# Patient Record
Sex: FEMALE | URBAN - METROPOLITAN AREA
[De-identification: names, ages, dates, MRNs, and addresses within clinical notes are randomized per-mention and may not be internally consistent; named-entity substitution may affect disease eponyms.]

---

## 2023-10-25 ENCOUNTER — ALLIED HEALTH (OUTPATIENT)
Dept: INTEGRATIVE MEDICINE | Facility: CLINIC | Age: 43
End: 2023-10-25
Payer: MEDICARE

## 2023-10-25 DIAGNOSIS — M79.10 MYALGIA: ICD-10-CM

## 2023-10-25 DIAGNOSIS — M99.03 SEGMENTAL AND SOMATIC DYSFUNCTION OF LUMBAR REGION: ICD-10-CM

## 2023-10-25 DIAGNOSIS — M99.05 SEGMENTAL AND SOMATIC DYSFUNCTION OF PELVIC REGION: ICD-10-CM

## 2023-10-25 DIAGNOSIS — M99.01 SEGMENTAL AND SOMATIC DYSFUNCTION OF CERVICAL REGION: Primary | ICD-10-CM

## 2023-10-25 DIAGNOSIS — M99.02 SEGMENTAL AND SOMATIC DYSFUNCTION OF THORACIC REGION: ICD-10-CM

## 2023-10-25 DIAGNOSIS — M54.59 MECHANICAL LOW BACK PAIN: ICD-10-CM

## 2023-10-25 DIAGNOSIS — M54.2 CERVICALGIA: ICD-10-CM

## 2023-10-25 PROCEDURE — 98941 CHIROPRACT MANJ 3-4 REGIONS: CPT | Performed by: CHIROPRACTOR

## 2023-10-25 PROCEDURE — 99203 OFFICE O/P NEW LOW 30 MIN: CPT | Performed by: CHIROPRACTOR

## 2023-10-25 ASSESSMENT — ENCOUNTER SYMPTOMS
LIGHT-HEADEDNESS: 0
CHEST TIGHTNESS: 0
FLANK PAIN: 0
FEVER: 0
MYALGIAS: 1
UNEXPECTED WEIGHT CHANGE: 0
TROUBLE SWALLOWING: 0
JOINT SWELLING: 0
FATIGUE: 0
DIZZINESS: 0
VOMITING: 0
SHORTNESS OF BREATH: 0
HEADACHES: 1
NECK STIFFNESS: 1
ARTHRALGIAS: 0
NUMBNESS: 0
NECK PAIN: 1
DIFFICULTY URINATING: 0
BACK PAIN: 1
CHILLS: 0
WEAKNESS: 0

## 2023-10-25 NOTE — PROGRESS NOTES
Subjective   Patient ID: Stefanie Blum is a 43 y.o. female who presents today, October 25, 2023 for a new patient evaluation and treatment of neck and low back pain.    (1/15) [facility]    Chiropractic Medicine HPI:  Provacative factors include : other: post exercise  Palliative factors incude : rest stretching other: massage  Pain is described as : ache dull nagging   Previous treatment for complaint has included : rest  Patient denies : difficulty walking bladder weakness bowel weakness catching clicking grinding instability numbness popping radiating pain into the distal extremity trauma  Intensity of the pain since last visit: Patient rates least severe pain at : 2/10 Patient rates most severe pain at : 5/10  Oswestry Disability Index has been completed: yes     Stefanie presents for evaluation and treatment of nec and low back pain. Patient states that low back symptoms are more chronic and intermittent. She states that the pain starts in the middle of the low back and goes to the left flank and top of the buttock. She denies any radicular pain into the LE. She states that neck pain began a month ago, the day after a workout. She states that it has been fairly constant since then. She states that she has been having headaches associated with the neck pain, at least once per week. She states that most of the neck pain is at the base of the skull on the left side. She states that nothing really increases low back or neck symptoms. She states that low back symptoms have been helped by chiropractic care and massage in the past. She states that rest has helped relieve neck pain. Patient denies any difficulty speaking/swallowing, vision changes, bowel/bladder issues, chest pain/shortness of breath, numbness/tingling.             Objective   Review of Systems   Constitutional:  Negative for chills, fatigue, fever and unexpected weight change.   HENT:  Negative for trouble swallowing.    Respiratory:  Negative for chest  tightness and shortness of breath.    Cardiovascular:  Negative for chest pain and leg swelling.   Gastrointestinal:  Negative for vomiting.   Genitourinary:  Negative for difficulty urinating and flank pain.   Musculoskeletal:  Positive for back pain, myalgias, neck pain and neck stiffness. Negative for arthralgias, gait problem and joint swelling.   Neurological:  Positive for headaches. Negative for dizziness, weakness, light-headedness and numbness.   Psychiatric/Behavioral:  Negative for self-injury and suicidal ideas.    All other systems reviewed and are negative.    Physical Exam  Constitutional:       General: She is not in acute distress.     Appearance: Normal appearance.       HENT:      Head: Normocephalic and atraumatic.   Musculoskeletal:      Cervical back: Tenderness present. Decreased range of motion.      Thoracic back: Tenderness present.      Lumbar back: Tenderness present.   Neurological:      General: No focal deficit present.      Mental Status: She is alert and oriented to person, place, and time.      Cranial Nerves: No dysarthria or facial asymmetry.      Sensory: Sensation is intact.      Motor: Motor function is intact.      Coordination: Coordination is intact.      Gait: Gait is intact.   Psychiatric:         Attention and Perception: Attention normal.         Mood and Affect: Mood normal.         Speech: Speech normal.         Behavior: Behavior is cooperative.        Spine Musculoskeletal Exam    Gait    Gait is normal.    Inspection    Thoracolumbar    Thoracolumbar inspection additional comments: Left hip hike    Palpation    Cervical Spine    Tenderness: present      Paraspinous: right and left      Trapezius: right and left      Periscapular: left    Right      Muscle tone: increased    Left      Muscle tone: increased    Thoracolumbar    Tenderness: present      Paraspinous: right and left      Gluteal: left      Iliac crest: left      SI Joint: left    Right      Muscle tone:  increased    Left      Muscle tone: increased    Range of Motion    Cervical Spine       Cervical flexion: chin to chest. Cervical flexion detail: no pain.     Cervical extension: reduced extension (0-25%). Cervical extension detail: no pain.       Right      Lateral bending: reduced bend (0-25%). Lateral bending detail: no pain.       Lateral rotation: reduced rotation (0-25%). Lateral rotation detail: no pain.       Left      Lateral bending: reduced bend (0-25%). Lateral bending detail: no pain.       Lateral rotation: reduced rotation (0-25%). Lateral rotation detail: no pain.      Thoracolumbar    Range of motion is normal.         Strength    Cervical Spine    Cervical spine motor exam is normal.    Thoracolumbar    Thoracolumbar motor exam is normal.       Sensory    Cervical Spine    Cervical spine sensation is normal.    Thoracolumbar    Thoracolumbar sensation is normal.    General    Neurological: alert     Other Orthopedic Tests:  Cervical: Right Maximum compression painless.  Left Maximum compression painless.  Cervical distraction positive.  Thoracic/Lumbar/Sacrum: Right Burgos's Thoracic painless.  Left Burgos's Thoracic painless.  Right Burgos's Lumbar painless.  Left Burgos's Lumbar with local pain.  Segmental Joint(s): Segmental joint dysfunction was assessed with motion palpation and is identified in the following areas:  Cervical : C1 C4 C5  Thoracic : T2., T5, T6, and T10  Lumbopelvic / Sacral SIJ : L5, L5/S1, and L SIJ      Assessment/Plan   Today's Treatment Included: Chiropractic manipulation to the Segmental Joint(s) Cervical : C1 C4 C5 Segmental Joint(s) Lumbopelvic/Sacral SIJ : L4, L5, L5/S1, and L SIJ Segmental Joint(s) Thoracic : T2., T5, T6, and T10   Treatment Techniques Used : Diversified CMT and Manual traction  Soft-Tissue manipulation    Soft-tissue mobilization was performed in the following areas:   Cervical paraspinal mm. bilateral, Upper Trapezius bilateral, and Levator Scap.  bilateral  Thoracic Paraspinal mm. bilateral and Rhomboids left  Lumbar Paraspinal mm. bilateral, Quadratus Lumborum left, and Gluteal mm. Glute. Med. left            Treatment Plan: The patient and I discussed the risks and benefits of Chiropractic Care.  Based on the patient's subjective complaints along with the examination findings, it is advised that a course of Chiropractic Treatment by initiated in the form of:   Chiropractic Manipulation (CMT)  Chiropractic treatment recommended at a frequency of 1 times per week for 4 weeks  Consent for care was given both written and orally by the patient.  The goals of the treatment will be to: decrease pain, increase range of motion, improve quality of life, and decrease muscular hypertonicity  The patient tolerated today's treatment with little or no additional discomfort and was instructed to contact the office for questions or concerns.   Follow up as scheduled.

## 2023-11-07 ENCOUNTER — ALLIED HEALTH (OUTPATIENT)
Dept: INTEGRATIVE MEDICINE | Facility: CLINIC | Age: 43
End: 2023-11-07
Payer: MEDICARE

## 2023-11-07 DIAGNOSIS — M79.10 MYALGIA: ICD-10-CM

## 2023-11-07 DIAGNOSIS — M99.01 SEGMENTAL AND SOMATIC DYSFUNCTION OF CERVICAL REGION: Primary | ICD-10-CM

## 2023-11-07 DIAGNOSIS — M54.59 MECHANICAL LOW BACK PAIN: ICD-10-CM

## 2023-11-07 DIAGNOSIS — M99.02 SEGMENTAL AND SOMATIC DYSFUNCTION OF THORACIC REGION: ICD-10-CM

## 2023-11-07 DIAGNOSIS — M54.2 CERVICALGIA: ICD-10-CM

## 2023-11-07 DIAGNOSIS — M99.05 SEGMENTAL AND SOMATIC DYSFUNCTION OF PELVIC REGION: ICD-10-CM

## 2023-11-07 DIAGNOSIS — M99.03 SEGMENTAL AND SOMATIC DYSFUNCTION OF LUMBAR REGION: ICD-10-CM

## 2023-11-07 PROCEDURE — 98941 CHIROPRACT MANJ 3-4 REGIONS: CPT | Performed by: CHIROPRACTOR

## 2023-11-07 NOTE — PROGRESS NOTES
Subjective   Patient ID: Stefanie Blum is a 43 y.o. female who presents November 7, 2023 for neck and low back pain.    (2/15) [facility]    Today, the patient rates their degree of pain as a 3 out of 10 on the numeric pain rating scale.     Stefanie returns for continued treatment of neck and low back pain. She states that she had mild increase in head pain after last visit that lasted for a day or so before dissipated. She states that she did not have any other soreness after last visit. She states that she had mild improvement in symptoms that have lasted until today. She states that she does not have a headache today. Denies any associated symptoms or change in medical history since last visit and will follow up in 1 week.              Objective   Physical Exam  Constitutional:       General: She is not in acute distress.     Appearance: Normal appearance.       HENT:      Head: Normocephalic and atraumatic.   Musculoskeletal:      Cervical back: Tenderness present. Decreased range of motion.      Thoracic back: Tenderness present.      Lumbar back: Tenderness present.   Neurological:      General: No focal deficit present.      Mental Status: She is alert and oriented to person, place, and time.      Cranial Nerves: No dysarthria or facial asymmetry.      Sensory: Sensation is intact.      Motor: Motor function is intact.      Coordination: Coordination is intact.      Gait: Gait is intact.   Psychiatric:         Attention and Perception: Attention normal.         Mood and Affect: Mood normal.         Speech: Speech normal.         Behavior: Behavior is cooperative.         Palpation of the following region(s) revealed:  Cervical: Upper trapezius bilateral, hypertonicity and tenderness.  Levator scapulae bilateral, hypertonicity and tenderness.  Cervical paraspinals bilateral, hypertonicity and tenderness.  Thoracic: Thoracic paraspinals bilateral, hypertonicity and tenderness.  Lumbar: Lumbar paraspinals bilateral,  hypertonicity and tenderness.  Quadratus lumborum left, hypertonicity and tenderness.  Gluteal left, hypertonicity and tenderness.        Segmental Joint(s): Segmental joint dysfunction was assessed with motion palpation and is identified in the following areas:  Cervical : C1 C4 C5  Thoracic : T2., T5, T6, and T10  Lumbopelvic / Sacral SIJ : L4, L5, L5/S1, and L SIJ      Assessment/Plan   Today's Treatment Included: Chiropractic manipulation to the Segmental Joint(s) Cervical : C1 C4 C5 Segmental Joint(s) Lumbopelvic/Sacral SIJ : L4, L5, L5/S1, and L SIJ Segmental Joint(s) Thoracic : T2., T5, T6, and T10   Treatment Techniques Used : Diversified CMT, Pelvic drop table technique, and Manual traction  Soft-Tissue manipulation    Soft-tissue mobilization was performed in the following areas:   Cervical paraspinal mm. bilateral, Upper Trapezius bilateral, and Levator Scap. bilateral  Thoracic Paraspinal mm. bilateral  Lumbar Paraspinal mm. bilateral, Quadratus Lumborum left, and Gluteal mm. Glute. Med. left            The patient tolerated today's treatment with little or no additional discomfort and was instructed to contact the office for questions or concerns.

## 2023-11-14 ENCOUNTER — ALLIED HEALTH (OUTPATIENT)
Dept: INTEGRATIVE MEDICINE | Facility: CLINIC | Age: 43
End: 2023-11-14
Payer: MEDICARE

## 2023-11-14 DIAGNOSIS — M54.59 MECHANICAL LOW BACK PAIN: ICD-10-CM

## 2023-11-14 DIAGNOSIS — M99.05 SEGMENTAL AND SOMATIC DYSFUNCTION OF PELVIC REGION: ICD-10-CM

## 2023-11-14 DIAGNOSIS — M79.10 MYALGIA: ICD-10-CM

## 2023-11-14 DIAGNOSIS — M99.03 SEGMENTAL AND SOMATIC DYSFUNCTION OF LUMBAR REGION: ICD-10-CM

## 2023-11-14 DIAGNOSIS — M54.2 CERVICALGIA: ICD-10-CM

## 2023-11-14 DIAGNOSIS — M99.01 SEGMENTAL AND SOMATIC DYSFUNCTION OF CERVICAL REGION: Primary | ICD-10-CM

## 2023-11-14 DIAGNOSIS — M99.02 SEGMENTAL AND SOMATIC DYSFUNCTION OF THORACIC REGION: ICD-10-CM

## 2023-11-14 PROCEDURE — 98941 CHIROPRACT MANJ 3-4 REGIONS: CPT | Performed by: CHIROPRACTOR

## 2023-11-14 NOTE — PROGRESS NOTES
Subjective   Patient ID: Stefanie Blum is a 43 y.o. female who presents November 14, 2023 for neck and low back pain.    (3/15) [facility]    Today, the patient rates their degree of pain as a 3 out of 10 on the numeric pain rating scale.     Stefanie returns for continued treatment of neck and low back pain. She states that she had improvement in symptoms after last visit that lasted for several days. She states that neck symptoms increased over the weekend while she was staying at a hotel with a very uncomfortable bed. Denies any associated symptoms or change in medical history since last visit and will follow up in 1 week.              Objective   Physical Exam  Constitutional:       General: She is not in acute distress.     Appearance: Normal appearance.       HENT:      Head: Normocephalic and atraumatic.   Musculoskeletal:      Cervical back: Tenderness present. Decreased range of motion.      Thoracic back: Tenderness present.      Lumbar back: Tenderness present.   Neurological:      General: No focal deficit present.      Mental Status: She is alert and oriented to person, place, and time.      Cranial Nerves: No dysarthria or facial asymmetry.      Sensory: Sensation is intact.      Motor: Motor function is intact.      Coordination: Coordination is intact.      Gait: Gait is intact.   Psychiatric:         Attention and Perception: Attention normal.         Mood and Affect: Mood normal.         Speech: Speech normal.         Behavior: Behavior is cooperative.         Palpation of the following region(s) revealed:  Cervical: Upper trapezius bilateral, hypertonicity and tenderness.  Levator scapulae bilateral, hypertonicity and tenderness.  Cervical paraspinals bilateral, hypertonicity and tenderness.  Thoracic: Thoracic paraspinals bilateral, hypertonicity and tenderness.  Lumbar: Lumbar paraspinals bilateral, hypertonicity and tenderness.  Quadratus lumborum left, hypertonicity and tenderness.  Gluteal left,  hypertonicity and tenderness.        Segmental Joint(s): Segmental joint dysfunction was assessed with motion palpation and is identified in the following areas:  Cervical : C1 C4 C5  Thoracic : T2., T5, T6, and T10  Lumbopelvic / Sacral SIJ : L4, L5, L5/S1, and L SIJ      Assessment/Plan   Today's Treatment Included: Chiropractic manipulation to the Segmental Joint(s) Cervical : C1 C4 C5 Segmental Joint(s) Lumbopelvic/Sacral SIJ : L4, L5, L5/S1, and L SIJ Segmental Joint(s) Thoracic : T2., T5, T6, and T10   Treatment Techniques Used : Diversified CMT, Pelvic drop table technique, and Manual traction  Soft-Tissue manipulation    Soft-tissue mobilization was performed in the following areas:   Cervical paraspinal mm. bilateral, Upper Trapezius bilateral, and Levator Scap. bilateral  Thoracic Paraspinal mm. bilateral  Lumbar Paraspinal mm. bilateral, Quadratus Lumborum left, and Gluteal mm. Glute. Med. left            The patient tolerated today's treatment with little or no additional discomfort and was instructed to contact the office for questions or concerns.

## 2023-11-21 ENCOUNTER — ALLIED HEALTH (OUTPATIENT)
Dept: INTEGRATIVE MEDICINE | Facility: CLINIC | Age: 43
End: 2023-11-21
Payer: MEDICARE

## 2023-11-21 DIAGNOSIS — M79.10 MYALGIA: ICD-10-CM

## 2023-11-21 DIAGNOSIS — M54.59 MECHANICAL LOW BACK PAIN: ICD-10-CM

## 2023-11-21 DIAGNOSIS — M99.05 SEGMENTAL AND SOMATIC DYSFUNCTION OF PELVIC REGION: ICD-10-CM

## 2023-11-21 DIAGNOSIS — M99.02 SEGMENTAL AND SOMATIC DYSFUNCTION OF THORACIC REGION: ICD-10-CM

## 2023-11-21 DIAGNOSIS — M99.03 SEGMENTAL AND SOMATIC DYSFUNCTION OF LUMBAR REGION: ICD-10-CM

## 2023-11-21 DIAGNOSIS — M54.2 CERVICALGIA: ICD-10-CM

## 2023-11-21 DIAGNOSIS — M99.01 SEGMENTAL AND SOMATIC DYSFUNCTION OF CERVICAL REGION: Primary | ICD-10-CM

## 2023-11-21 PROCEDURE — 98941 CHIROPRACT MANJ 3-4 REGIONS: CPT | Performed by: CHIROPRACTOR

## 2023-11-21 NOTE — PROGRESS NOTES
Subjective   Patient ID: Stefanie Blum is a 43 y.o. female who presents November 21, 2023 for neck and low back pain.    (4/15) [facility]    Today, the patient rates their degree of pain as a 2 out of 10 on the numeric pain rating scale.     Stefanie returns for continued treatment of neck and low back pain. She states that she has had improvement in symptoms since treatment began. She states that she mostly has neck/upper back tension. She states that she only has low back discomfort while she is lying prone on the chiropractic table. Denies any associated symptoms or change in medical history since last visit and will follow up in 1 week.              Objective   Physical Exam  Constitutional:       General: She is not in acute distress.     Appearance: Normal appearance.       HENT:      Head: Normocephalic and atraumatic.   Musculoskeletal:      Cervical back: Tenderness present. Decreased range of motion.      Thoracic back: Tenderness present.      Lumbar back: Tenderness present.   Neurological:      General: No focal deficit present.      Mental Status: She is alert and oriented to person, place, and time.      Cranial Nerves: No dysarthria or facial asymmetry.      Sensory: Sensation is intact.      Motor: Motor function is intact.      Coordination: Coordination is intact.      Gait: Gait is intact.   Psychiatric:         Attention and Perception: Attention normal.         Mood and Affect: Mood normal.         Speech: Speech normal.         Behavior: Behavior is cooperative.         Palpation of the following region(s) revealed:  Cervical: Upper trapezius bilateral, hypertonicity and tenderness.  Levator scapulae bilateral, hypertonicity and tenderness.  Cervical paraspinals bilateral, hypertonicity and tenderness.  Thoracic: Thoracic paraspinals bilateral, hypertonicity and tenderness.  Lumbar: Lumbar paraspinals bilateral, hypertonicity and tenderness.  Quadratus lumborum left, hypertonicity and  tenderness.  Gluteal left, hypertonicity and tenderness.        Segmental Joint(s): Segmental joint dysfunction was assessed with motion palpation and is identified in the following areas:  Cervical : C1 C4 C5  Thoracic : T2., T5, T6, and T10  Lumbopelvic / Sacral SIJ : L4, L5, L5/S1, and L SIJ      Assessment/Plan   Today's Treatment Included: Chiropractic manipulation to the Segmental Joint(s) Cervical : C1 C4 C5 Segmental Joint(s) Lumbopelvic/Sacral SIJ : L4, L5, L5/S1, and L SIJ Segmental Joint(s) Thoracic : T2., T5, T6, and T10   Treatment Techniques Used : Diversified CMT, Pelvic drop table technique, and Manual traction  Soft-Tissue manipulation    Soft-tissue mobilization was performed in the following areas:   Cervical paraspinal mm. bilateral, Upper Trapezius bilateral, and Levator Scap. bilateral  Thoracic Paraspinal mm. bilateral  Lumbar Paraspinal mm. bilateral, Quadratus Lumborum left, and Gluteal mm. Glute. Med. left            The patient tolerated today's treatment with little or no additional discomfort and was instructed to contact the office for questions or concerns.

## 2023-11-28 ENCOUNTER — APPOINTMENT (OUTPATIENT)
Dept: INTEGRATIVE MEDICINE | Facility: CLINIC | Age: 43
End: 2023-11-28
Payer: MEDICARE

## 2024-02-06 ENCOUNTER — OFFICE VISIT (OUTPATIENT)
Dept: PRIMARY CARE | Facility: CLINIC | Age: 44
End: 2024-02-06
Payer: COMMERCIAL

## 2024-02-06 ENCOUNTER — LAB (OUTPATIENT)
Dept: LAB | Facility: LAB | Age: 44
End: 2024-02-06
Payer: COMMERCIAL

## 2024-02-06 VITALS
RESPIRATION RATE: 18 BRPM | DIASTOLIC BLOOD PRESSURE: 74 MMHG | HEART RATE: 64 BPM | SYSTOLIC BLOOD PRESSURE: 122 MMHG | OXYGEN SATURATION: 98 % | WEIGHT: 120 LBS

## 2024-02-06 DIAGNOSIS — Z00.00 ROUTINE ADULT HEALTH MAINTENANCE: Primary | ICD-10-CM

## 2024-02-06 DIAGNOSIS — D50.9 MICROCYTIC ANEMIA: ICD-10-CM

## 2024-02-06 DIAGNOSIS — Z11.3 SCREEN FOR STD (SEXUALLY TRANSMITTED DISEASE): ICD-10-CM

## 2024-02-06 DIAGNOSIS — E78.5 HYPERLIPIDEMIA, UNSPECIFIED HYPERLIPIDEMIA TYPE: ICD-10-CM

## 2024-02-06 DIAGNOSIS — M85.672 BONE CYST OF LEFT FOOT: ICD-10-CM

## 2024-02-06 DIAGNOSIS — I10 PRIMARY HYPERTENSION: ICD-10-CM

## 2024-02-06 DIAGNOSIS — J30.2 SEASONAL ALLERGIES: ICD-10-CM

## 2024-02-06 DIAGNOSIS — Z12.11 COLON CANCER SCREENING: ICD-10-CM

## 2024-02-06 DIAGNOSIS — Z23 NEED FOR TETANUS BOOSTER: ICD-10-CM

## 2024-02-06 DIAGNOSIS — Z30.431 ENCOUNTER FOR ROUTINE CHECKING OF INTRAUTERINE CONTRACEPTIVE DEVICE (IUD): ICD-10-CM

## 2024-02-06 DIAGNOSIS — Z12.31 BREAST CANCER SCREENING BY MAMMOGRAM: ICD-10-CM

## 2024-02-06 DIAGNOSIS — A60.00 GENITAL HERPES SIMPLEX, UNSPECIFIED SITE: ICD-10-CM

## 2024-02-06 DIAGNOSIS — E55.9 VITAMIN D DEFICIENCY: ICD-10-CM

## 2024-02-06 LAB
ALBUMIN SERPL BCP-MCNC: 4.2 G/DL (ref 3.4–5)
ALP SERPL-CCNC: 47 U/L (ref 33–110)
ALT SERPL W P-5'-P-CCNC: 11 U/L (ref 7–45)
ANION GAP SERPL CALC-SCNC: 12 MMOL/L (ref 10–20)
AST SERPL W P-5'-P-CCNC: 18 U/L (ref 9–39)
BILIRUB SERPL-MCNC: 0.7 MG/DL (ref 0–1.2)
BUN SERPL-MCNC: 13 MG/DL (ref 6–23)
CALCIUM SERPL-MCNC: 9.2 MG/DL (ref 8.6–10.6)
CHLORIDE SERPL-SCNC: 105 MMOL/L (ref 98–107)
CHOLEST SERPL-MCNC: 178 MG/DL (ref 0–199)
CHOLESTEROL/HDL RATIO: 2.5
CO2 SERPL-SCNC: 25 MMOL/L (ref 21–32)
CREAT SERPL-MCNC: 0.97 MG/DL (ref 0.5–1.05)
EGFRCR SERPLBLD CKD-EPI 2021: 74 ML/MIN/1.73M*2
ERYTHROCYTE [DISTWIDTH] IN BLOOD BY AUTOMATED COUNT: 15.3 % (ref 11.5–14.5)
GLUCOSE SERPL-MCNC: 78 MG/DL (ref 74–99)
HCT VFR BLD AUTO: 35.5 % (ref 36–46)
HCV AB SER QL: NONREACTIVE
HDLC SERPL-MCNC: 71.4 MG/DL
HGB BLD-MCNC: 11.2 G/DL (ref 12–16)
HIV 1+2 AB+HIV1 P24 AG SERPL QL IA: NONREACTIVE
IRON SATN MFR SERPL: 23 % (ref 25–45)
IRON SERPL-MCNC: 72 UG/DL (ref 35–150)
LDLC SERPL CALC-MCNC: 98 MG/DL
MCH RBC QN AUTO: 22.9 PG (ref 26–34)
MCHC RBC AUTO-ENTMCNC: 31.5 G/DL (ref 32–36)
MCV RBC AUTO: 72 FL (ref 80–100)
NON HDL CHOLESTEROL: 107 MG/DL (ref 0–149)
NRBC BLD-RTO: 0 /100 WBCS (ref 0–0)
PLATELET # BLD AUTO: 205 X10*3/UL (ref 150–450)
POC APPEARANCE, URINE: CLEAR
POC BILIRUBIN, URINE: NEGATIVE
POC BLOOD, URINE: NEGATIVE
POC COLOR, URINE: YELLOW
POC GLUCOSE, URINE: NEGATIVE MG/DL
POC KETONES, URINE: NEGATIVE MG/DL
POC LEUKOCYTES, URINE: NEGATIVE
POC NITRITE,URINE: NEGATIVE
POC PH, URINE: 6 PH
POC PROTEIN, URINE: NEGATIVE MG/DL
POC SPECIFIC GRAVITY, URINE: 1.02
POC UROBILINOGEN, URINE: 0.2 EU/DL
POTASSIUM SERPL-SCNC: 3.9 MMOL/L (ref 3.5–5.3)
PROT SERPL-MCNC: 6.3 G/DL (ref 6.4–8.2)
RBC # BLD AUTO: 4.9 X10*6/UL (ref 4–5.2)
SODIUM SERPL-SCNC: 138 MMOL/L (ref 136–145)
TIBC SERPL-MCNC: 311 UG/DL (ref 240–445)
TRIGL SERPL-MCNC: 45 MG/DL (ref 0–149)
TSH SERPL-ACNC: 1.69 MIU/L (ref 0.44–3.98)
UIBC SERPL-MCNC: 239 UG/DL (ref 110–370)
VLDL: 9 MG/DL (ref 0–40)
WBC # BLD AUTO: 5.9 X10*3/UL (ref 4.4–11.3)

## 2024-02-06 PROCEDURE — 36415 COLL VENOUS BLD VENIPUNCTURE: CPT

## 2024-02-06 PROCEDURE — 1036F TOBACCO NON-USER: CPT | Performed by: PHYSICIAN ASSISTANT

## 2024-02-06 PROCEDURE — 82652 VIT D 1 25-DIHYDROXY: CPT

## 2024-02-06 PROCEDURE — 83550 IRON BINDING TEST: CPT

## 2024-02-06 PROCEDURE — 84443 ASSAY THYROID STIM HORMONE: CPT

## 2024-02-06 PROCEDURE — 3078F DIAST BP <80 MM HG: CPT | Performed by: PHYSICIAN ASSISTANT

## 2024-02-06 PROCEDURE — 80061 LIPID PANEL: CPT

## 2024-02-06 PROCEDURE — 85027 COMPLETE CBC AUTOMATED: CPT

## 2024-02-06 PROCEDURE — 81002 URINALYSIS NONAUTO W/O SCOPE: CPT | Performed by: PHYSICIAN ASSISTANT

## 2024-02-06 PROCEDURE — 83540 ASSAY OF IRON: CPT

## 2024-02-06 PROCEDURE — 80053 COMPREHEN METABOLIC PANEL: CPT

## 2024-02-06 PROCEDURE — 90715 TDAP VACCINE 7 YRS/> IM: CPT | Performed by: PHYSICIAN ASSISTANT

## 2024-02-06 PROCEDURE — 87389 HIV-1 AG W/HIV-1&-2 AB AG IA: CPT

## 2024-02-06 PROCEDURE — 99205 OFFICE O/P NEW HI 60 MIN: CPT | Performed by: PHYSICIAN ASSISTANT

## 2024-02-06 PROCEDURE — 86803 HEPATITIS C AB TEST: CPT

## 2024-02-06 PROCEDURE — 3074F SYST BP LT 130 MM HG: CPT | Performed by: PHYSICIAN ASSISTANT

## 2024-02-06 PROCEDURE — 90471 IMMUNIZATION ADMIN: CPT | Performed by: PHYSICIAN ASSISTANT

## 2024-02-06 RX ORDER — VALACYCLOVIR HYDROCHLORIDE 500 MG/1
500 TABLET, FILM COATED ORAL DAILY
COMMUNITY
End: 2024-03-04 | Stop reason: SDUPTHER

## 2024-02-06 RX ORDER — ERGOCALCIFEROL 1.25 MG/1
1 CAPSULE ORAL
COMMUNITY
Start: 2023-12-29 | End: 2024-05-23 | Stop reason: SDUPTHER

## 2024-02-06 RX ORDER — LOSARTAN POTASSIUM 25 MG/1
25 TABLET ORAL DAILY
COMMUNITY
Start: 2024-01-27 | End: 2024-03-04 | Stop reason: SDUPTHER

## 2024-02-06 RX ORDER — FERROUS SULFATE TAB 325 MG (65 MG ELEMENTAL FE) 325 (65 FE) MG
1 TAB ORAL DAILY
COMMUNITY
Start: 2023-12-28

## 2024-02-06 ASSESSMENT — ENCOUNTER SYMPTOMS
LOSS OF SENSATION IN FEET: 0
COUGH: 0
RESPIRATORY NEGATIVE: 1
VOMITING: 0
NERVOUS/ANXIOUS: 0
DIZZINESS: 0
HEMATOLOGIC/LYMPHATIC NEGATIVE: 1
SHORTNESS OF BREATH: 0
EYES NEGATIVE: 1
PSYCHIATRIC NEGATIVE: 1
NEUROLOGICAL NEGATIVE: 1
CONSTITUTIONAL NEGATIVE: 1
OCCASIONAL FEELINGS OF UNSTEADINESS: 0
GASTROINTESTINAL NEGATIVE: 1
ENDOCRINE NEGATIVE: 1
WHEEZING: 0
HEADACHES: 0
CARDIOVASCULAR NEGATIVE: 1
ABDOMINAL PAIN: 0
DEPRESSION: 0
ALLERGIC/IMMUNOLOGIC NEGATIVE: 1
ARTHRALGIAS: 0
NAUSEA: 0
BACK PAIN: 1
PALPITATIONS: 0

## 2024-02-06 ASSESSMENT — PATIENT HEALTH QUESTIONNAIRE - PHQ9
1. LITTLE INTEREST OR PLEASURE IN DOING THINGS: NOT AT ALL
SUM OF ALL RESPONSES TO PHQ9 QUESTIONS 1 AND 2: 0
2. FEELING DOWN, DEPRESSED OR HOPELESS: NOT AT ALL

## 2024-02-06 NOTE — PROGRESS NOTES
Subjective   Patient ID: Stefanie Blum is a 43 y.o. female who presents for New Patient Visit.    HPI   Patient Stefanie Blum 43 y.o. 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  children- 18yo 14 yo HB _   specialists - GYN   [facility] dental and vision UTD     PMhx significant medical hx HTN iron def anemia - mild hyperlipidemia - on fish oil - genital herpes - vit d def   PShx: rhinoplasty and breast augmentation   Social hx: etoh- , no tobacco use, no illicit drug use   Healthy -low cholesterol diet and exercise- yoga   immunizations - tdap due today - declined flu and covid this season   FMhx: mother Htn - pancreatic cancer , father HTN   Past medical, surgical, social, and family history all reviewed   patient states feeling well otherwise  denies fever, chills, N/V, headache, dizziness, CP, SOB, palpitations, edema, numbness, tingling, weakness  A chaperone was offered to the patient for the physical exam /  exam and was declined       Review of Systems   Constitutional: Negative.    HENT: Negative.     Eyes: Negative.    Respiratory: Negative.  Negative for cough, shortness of breath and wheezing.    Cardiovascular: Negative.  Negative for chest pain and palpitations.   Gastrointestinal: Negative.  Negative for abdominal pain, nausea and vomiting.   Endocrine: Negative.    Genitourinary: Negative.    Musculoskeletal:  Positive for back pain. Negative for arthralgias.        Cyst on left foot - top near 1st metatarsal -    Skin: Negative.  Negative for rash.   Allergic/Immunologic: Negative.    Neurological: Negative.  Negative for dizziness and headaches.   Hematological: Negative.         Hx anemia    Psychiatric/Behavioral: Negative.  The patient is not nervous/anxious.        Objective   /74 (BP Location: Right arm, Patient Position: Sitting)   Pulse 64   Resp 18   Wt 54.4 kg (120 lb)   SpO2 98%      Physical Exam  Vitals and nursing note reviewed.   Constitutional:       Appearance: Normal appearance. She is normal weight.   HENT:      Head: Normocephalic and atraumatic.      Right Ear: Tympanic membrane, ear canal and external ear normal.      Left Ear: Tympanic membrane, ear canal and external ear normal.      Nose: Nose normal.      Mouth/Throat:      Mouth: Mucous membranes are moist.      Pharynx: Oropharynx is clear.   Eyes:      Extraocular Movements: Extraocular movements intact.      Pupils: Pupils are equal, round, and reactive to light.   Cardiovascular:      Rate and Rhythm: Normal rate and regular rhythm.      Heart sounds: Normal heart sounds.   Pulmonary:      Effort: Pulmonary effort is normal.      Breath sounds: Normal breath sounds.   Abdominal:      General: Abdomen is flat. Bowel sounds are normal.      Palpations: Abdomen is soft.   Genitourinary:     Comments: Per GYN   Musculoskeletal:         General: Normal range of motion.      Cervical back: Normal range of motion and neck supple.      Comments: Cyst on left foot - top near 1st metatarsal - nontender - soft and mobile    Skin:     General: Skin is warm and dry.   Neurological:      General: No focal deficit present.      Mental Status: She is alert and oriented to person, place, and time.   Psychiatric:         Mood and Affect: Mood normal.         Behavior: Behavior normal.         Thought Content: Thought content normal.         Judgment: Judgment normal.         Assessment/Plan   Problem List Items Addressed This Visit       Primary hypertension    Relevant Orders    Comprehensive Metabolic Panel    TSH with reflex to Free T4 if abnormal    Vitamin D deficiency    Relevant Orders    Vitamin D 1,25 Dihydroxy (for eval of hypercalcemia)    Seasonal allergies    Microcytic anemia    Relevant Orders    CBC    Iron and TIBC    Hyperlipidemia - Primary    Relevant Orders    Comprehensive Metabolic Panel    Lipid Panel    Genital  herpes simplex     Other Visit Diagnoses       Screen for STD (sexually transmitted disease)        Relevant Orders    HIV 1/2 Antigen/Antibody Screen with Reflex to Confirmation    Hepatitis C Antibody    Need for tetanus booster        Relevant Orders    Tdap vaccine, age 7 years and older    Breast cancer screening by mammogram        Relevant Orders    BI mammo bilateral screening tomosynthesis    Encounter for routine checking of intrauterine contraceptive device (IUD)        Relevant Orders    Referral to Obstetrics / Gynecology    Colon cancer screening        Relevant Orders    Colonoscopy Screening; Average Risk Patient    Bone cyst of left foot        Relevant Orders    Referral to Podiatry          Est/ iron def anemia/ genital herpes     -  patient stable and healthy  -  discussed healthy diet and exercise plan   -  continue medications as directed, SEs, risks and options discussed   -  f/u with specialists as directed   -  dental and vision exams, f/u as directed   -  Labs ordered today, will call when results are available -Vaccinations recommended today: Influenza- declined Tdap- DUE tolerated well   -Follow-up annual exam in one year  -Colon cancer screening - DUE   -Follow-up in one week to discuss all results  - refer to gyn -IUD     Cyst on foot - refer to podiatry - ? Draining     Iron def anemia-- OTC iron - labs today     HTN     Benign essential hypertension (401.1) (I10)  -stable  -good control  -Continue current medications, side effects, risks and options discussed, patient aware   -Encouraged dietary sodium restriction/ DASH diet  -Recommend regular aerobic exercise  -Discussed need and benefit for weight loss  -Recheck in 4-6 months or sooner should any symptoms or problems arise  - Goal of blood pressure less than 130/80     Elevated cholesterol   -due for fasting labs today = continue OTC fish oil   -Encouraged following a low-fat low-cholesterol diet   -Discussed the benefits of  regular aerobic exercise and weight loss  -Encouraged following a low-carb healthy oil intake diet        Back pain - home stretches and exercises - sees chiropractor    complexity visit of 45   minutes face-to-face with at least half of the time discussing  Results of my examination, clinical findings, impression and diagnoses discussed with the patient as well as results of the latest test/lab work. The patient was in agreement with the plan and verbalized a good understanding of instructions and plans for treatment. At the end of the visit today, the patient felt that all questions had been answered satisfactorily. The patient was pleased with the visit and very appreciative for the care rendered.

## 2024-02-07 NOTE — RESULT ENCOUNTER NOTE
Please call and inform pt that all labs are stable - slight anemia - continue low fat cholesterol low sugar healthy diet with exercise multivitamin and vitamin D daily   And call office with any further questions or concerns

## 2024-02-08 LAB — 1,25(OH)2D3 SERPL-MCNC: 87.2 PG/ML (ref 19.9–79.3)

## 2024-03-04 DIAGNOSIS — I10 PRIMARY HYPERTENSION: ICD-10-CM

## 2024-03-04 DIAGNOSIS — A60.00 GENITAL HERPES SIMPLEX, UNSPECIFIED SITE: ICD-10-CM

## 2024-03-05 RX ORDER — LOSARTAN POTASSIUM 25 MG/1
25 TABLET ORAL DAILY
Qty: 90 TABLET | Refills: 1 | Status: SHIPPED | OUTPATIENT
Start: 2024-03-05

## 2024-03-05 RX ORDER — VALACYCLOVIR HYDROCHLORIDE 500 MG/1
500 TABLET, FILM COATED ORAL DAILY
Qty: 10 TABLET | Refills: 1 | Status: SHIPPED | OUTPATIENT
Start: 2024-03-05 | End: 2024-03-08 | Stop reason: SDUPTHER

## 2024-03-08 DIAGNOSIS — A60.00 GENITAL HERPES SIMPLEX, UNSPECIFIED SITE: ICD-10-CM

## 2024-03-11 RX ORDER — VALACYCLOVIR HYDROCHLORIDE 500 MG/1
500 TABLET, FILM COATED ORAL DAILY
Qty: 30 TABLET | Refills: 5 | Status: SHIPPED | OUTPATIENT
Start: 2024-03-11 | End: 2024-09-07

## 2024-05-23 DIAGNOSIS — E55.9 VITAMIN D DEFICIENCY: ICD-10-CM

## 2024-05-23 RX ORDER — ERGOCALCIFEROL 1.25 MG/1
1 CAPSULE ORAL
Qty: 30 CAPSULE | Refills: 0 | Status: SHIPPED | OUTPATIENT
Start: 2024-05-26

## 2024-06-11 DIAGNOSIS — I10 PRIMARY HYPERTENSION: ICD-10-CM

## 2024-06-12 RX ORDER — LOSARTAN POTASSIUM 25 MG/1
25 TABLET ORAL DAILY
Qty: 90 TABLET | Refills: 1 | Status: SHIPPED | OUTPATIENT
Start: 2024-06-12

## 2024-07-09 ENCOUNTER — ALLIED HEALTH (OUTPATIENT)
Dept: INTEGRATIVE MEDICINE | Facility: CLINIC | Age: 44
End: 2024-07-09
Payer: COMMERCIAL

## 2024-07-09 DIAGNOSIS — M99.03 SEGMENTAL AND SOMATIC DYSFUNCTION OF LUMBAR REGION: ICD-10-CM

## 2024-07-09 DIAGNOSIS — M99.05 SEGMENTAL AND SOMATIC DYSFUNCTION OF PELVIC REGION: ICD-10-CM

## 2024-07-09 DIAGNOSIS — M79.10 MYALGIA: ICD-10-CM

## 2024-07-09 DIAGNOSIS — M54.2 CERVICALGIA: ICD-10-CM

## 2024-07-09 DIAGNOSIS — M54.59 MECHANICAL LOW BACK PAIN: ICD-10-CM

## 2024-07-09 DIAGNOSIS — M99.02 SEGMENTAL AND SOMATIC DYSFUNCTION OF THORACIC REGION: ICD-10-CM

## 2024-07-09 DIAGNOSIS — M99.01 SEGMENTAL AND SOMATIC DYSFUNCTION OF CERVICAL REGION: Primary | ICD-10-CM

## 2024-07-09 PROCEDURE — 98941 CHIROPRACT MANJ 3-4 REGIONS: CPT | Performed by: CHIROPRACTOR

## 2024-07-09 NOTE — PROGRESS NOTES
Subjective   Patient ID: Gigi Blum is a 44 y.o. female who presents July 9, 2024 for neck and low back pain.    Visits: 1   $$$ PCY    Today, the patient rates their degree of pain as a 4 out of 10 on the numeric pain rating scale.     Gigi returns for continued treatment of neck pain. She states that she had been feeling better. She states that neck pain returned about 2 months ago. She states that she has been having more headaches recently and that prompted her to return to care. She states that her low back has been achy but painful. Denies any associated symptoms or change in medical history since last visit and will follow up in 1 week.              Objective   Physical Exam  Constitutional:       General: She is not in acute distress.     Appearance: Normal appearance.       HENT:      Head: Normocephalic and atraumatic.   Musculoskeletal:      Cervical back: Tenderness present. Decreased range of motion.      Thoracic back: Tenderness present.      Lumbar back: Tenderness present.   Neurological:      General: No focal deficit present.      Mental Status: She is alert and oriented to person, place, and time.      Cranial Nerves: No dysarthria or facial asymmetry.      Sensory: Sensation is intact.      Motor: Motor function is intact.      Coordination: Coordination is intact.      Gait: Gait is intact.   Psychiatric:         Attention and Perception: Attention normal.         Mood and Affect: Mood normal.         Speech: Speech normal.         Behavior: Behavior is cooperative.         Palpation of the following region(s) revealed:  Cervical: Upper trapezius bilateral, hypertonicity and tenderness.  Levator scapulae bilateral, hypertonicity and tenderness.  Cervical paraspinals bilateral, hypertonicity and tenderness.  Thoracic: Thoracic paraspinals bilateral, hypertonicity and tenderness.  Lumbar: Lumbar paraspinals bilateral, hypertonicity and tenderness.  Quadratus lumborum left, hypertonicity and  tenderness.  Gluteal left, hypertonicity and tenderness.        Segmental Joint(s): Segmental joint dysfunction was assessed with motion palpation and is identified in the following areas:  Cervical : C2 C3 C4 C5  Thoracic : T2., T5, T6, and T10  Lumbopelvic / Sacral SIJ : L4, L5, L5/S1, and L SIJ      Assessment/Plan   Today's Treatment Included: Chiropractic manipulation to the Segmental Joint(s) Cervical : C2 C3 C4 C5 Segmental Joint(s) Lumbopelvic/Sacral SIJ : L4, L5, L5/S1, and L SIJ Segmental Joint(s) Thoracic : T2., T5, T6, and T10   Treatment Techniques Used : Diversified CMT, Pelvic drop table technique, and Manual traction  Soft-Tissue manipulation    Soft-tissue mobilization was performed in the following areas:   Cervical paraspinal mm. bilateral, Upper Trapezius bilateral, and Levator Scap. bilateral  Thoracic Paraspinal mm. bilateral  Lumbar Paraspinal mm. bilateral, Quadratus Lumborum left, and Gluteal mm. Glute. Med. left            The patient tolerated today's treatment with little or no additional discomfort and was instructed to contact the office for questions or concerns.

## 2024-07-11 DIAGNOSIS — I10 PRIMARY HYPERTENSION: ICD-10-CM

## 2024-07-11 DIAGNOSIS — A60.00 GENITAL HERPES SIMPLEX, UNSPECIFIED SITE: ICD-10-CM

## 2024-07-11 RX ORDER — LOSARTAN POTASSIUM 25 MG/1
25 TABLET ORAL DAILY
Qty: 90 TABLET | Refills: 1 | Status: SHIPPED | OUTPATIENT
Start: 2024-07-11

## 2024-07-11 RX ORDER — VALACYCLOVIR HYDROCHLORIDE 500 MG/1
500 TABLET, FILM COATED ORAL DAILY
Qty: 30 TABLET | Refills: 2 | Status: SHIPPED | OUTPATIENT
Start: 2024-07-11 | End: 2024-10-09

## 2024-07-17 ENCOUNTER — APPOINTMENT (OUTPATIENT)
Dept: INTEGRATIVE MEDICINE | Facility: CLINIC | Age: 44
End: 2024-07-17
Payer: COMMERCIAL

## 2024-07-17 DIAGNOSIS — M99.03 SEGMENTAL AND SOMATIC DYSFUNCTION OF LUMBAR REGION: ICD-10-CM

## 2024-07-17 DIAGNOSIS — M99.02 SEGMENTAL AND SOMATIC DYSFUNCTION OF THORACIC REGION: ICD-10-CM

## 2024-07-17 DIAGNOSIS — M99.01 SEGMENTAL AND SOMATIC DYSFUNCTION OF CERVICAL REGION: Primary | ICD-10-CM

## 2024-07-17 DIAGNOSIS — M54.59 MECHANICAL LOW BACK PAIN: ICD-10-CM

## 2024-07-17 DIAGNOSIS — M54.2 CERVICALGIA: ICD-10-CM

## 2024-07-17 DIAGNOSIS — M79.10 MYALGIA: ICD-10-CM

## 2024-07-17 DIAGNOSIS — M99.05 SEGMENTAL AND SOMATIC DYSFUNCTION OF PELVIC REGION: ICD-10-CM

## 2024-07-17 PROCEDURE — 98941 CHIROPRACT MANJ 3-4 REGIONS: CPT | Performed by: CHIROPRACTOR

## 2024-07-17 NOTE — PROGRESS NOTES
Subjective   Patient ID: Gigi Blum is a 44 y.o. female who presents July 17, 2024 for neck and low back pain.    Visits: 2   $$$ PCY    Today, the patient rates their degree of pain as a 2 out of 10 on the numeric pain rating scale.     Gigi returns for continued treatment of neck pain. She states that she had improvement in symptoms after last visit that has lasted until today. She states that she had some increased soreness after last visit that lasted for about a day. She states that she has had less head pain. Denies any associated symptoms or change in medical history since last visit and will follow up in 3-4 weeks.              Objective   Physical Exam  Constitutional:       General: She is not in acute distress.     Appearance: Normal appearance.       HENT:      Head: Normocephalic and atraumatic.   Musculoskeletal:      Cervical back: Tenderness present. Decreased range of motion.      Thoracic back: Tenderness present.      Lumbar back: Tenderness present.   Neurological:      General: No focal deficit present.      Mental Status: She is alert and oriented to person, place, and time.      Cranial Nerves: No dysarthria or facial asymmetry.      Sensory: Sensation is intact.      Motor: Motor function is intact.      Coordination: Coordination is intact.      Gait: Gait is intact.   Psychiatric:         Attention and Perception: Attention normal.         Mood and Affect: Mood normal.         Speech: Speech normal.         Behavior: Behavior is cooperative.         Palpation of the following region(s) revealed:  Cervical: Upper trapezius bilateral, hypertonicity and tenderness.  Levator scapulae bilateral, hypertonicity and tenderness.  Cervical paraspinals bilateral, hypertonicity and tenderness.  Thoracic: Thoracic paraspinals bilateral, hypertonicity and tenderness.  Lumbar: Lumbar paraspinals bilateral, hypertonicity and tenderness.  Quadratus lumborum left, hypertonicity and tenderness.  Gluteal  left, hypertonicity and tenderness.        Segmental Joint(s): Segmental joint dysfunction was assessed with motion palpation and is identified in the following areas:  Cervical : C2 C3 C4 C5  Thoracic : T2., T5, T6, and T10  Lumbopelvic / Sacral SIJ : L4, L5, L5/S1, and L SIJ      Assessment/Plan   Today's Treatment Included: Chiropractic manipulation to the Segmental Joint(s) Cervical : C2 C3 C4 C5 Segmental Joint(s) Lumbopelvic/Sacral SIJ : L4, L5, L5/S1, and L SIJ Segmental Joint(s) Thoracic : T2., T5, T6, and T10   Treatment Techniques Used : Diversified CMT, Pelvic drop table technique, and Manual traction  Soft-Tissue manipulation    Soft-tissue mobilization was performed in the following areas:   Cervical paraspinal mm. bilateral, Upper Trapezius bilateral, and Levator Scap. bilateral  Thoracic Paraspinal mm. bilateral  Lumbar Paraspinal mm. bilateral, Quadratus Lumborum left, and Gluteal mm. Glute. Med. left            The patient tolerated today's treatment with little or no additional discomfort and was instructed to contact the office for questions or concerns.

## 2024-08-07 ENCOUNTER — APPOINTMENT (OUTPATIENT)
Dept: INTEGRATIVE MEDICINE | Facility: CLINIC | Age: 44
End: 2024-08-07
Payer: COMMERCIAL

## 2024-08-07 DIAGNOSIS — M99.02 SEGMENTAL AND SOMATIC DYSFUNCTION OF THORACIC REGION: ICD-10-CM

## 2024-08-07 DIAGNOSIS — M99.01 SEGMENTAL AND SOMATIC DYSFUNCTION OF CERVICAL REGION: Primary | ICD-10-CM

## 2024-08-07 DIAGNOSIS — M54.59 MECHANICAL LOW BACK PAIN: ICD-10-CM

## 2024-08-07 DIAGNOSIS — M54.2 CERVICALGIA: ICD-10-CM

## 2024-08-07 DIAGNOSIS — M79.10 MYALGIA: ICD-10-CM

## 2024-08-07 DIAGNOSIS — M99.05 SEGMENTAL AND SOMATIC DYSFUNCTION OF PELVIC REGION: ICD-10-CM

## 2024-08-07 DIAGNOSIS — M99.03 SEGMENTAL AND SOMATIC DYSFUNCTION OF LUMBAR REGION: ICD-10-CM

## 2024-08-07 PROCEDURE — 98941 CHIROPRACT MANJ 3-4 REGIONS: CPT | Performed by: CHIROPRACTOR

## 2024-08-07 NOTE — PROGRESS NOTES
Subjective   Patient ID: Gigi Blum is a 44 y.o. female who presents August 7, 2024 for neck and low back pain.    Visits: 3   $$$ PCY    Today, the patient rates their degree of pain as a 2 out of 10 on the numeric pain rating scale.     Gigi returns for continued treatment of neck pain. She states that she has been doing well. She states that she has had mild flare ups in neck pain since last visit. She states that she occasionally has had a headache with increased neck symptoms. She states that her low back has mostly been tight. Denies any associated symptoms or change in medical history since last visit and will follow up in 2 weeks.              Objective   Physical Exam  Constitutional:       General: She is not in acute distress.     Appearance: Normal appearance.       HENT:      Head: Normocephalic and atraumatic.   Musculoskeletal:      Cervical back: Tenderness present. Decreased range of motion.      Thoracic back: Tenderness present.      Lumbar back: Tenderness present.   Neurological:      General: No focal deficit present.      Mental Status: She is alert and oriented to person, place, and time.      Cranial Nerves: No dysarthria or facial asymmetry.      Sensory: Sensation is intact.      Motor: Motor function is intact.      Coordination: Coordination is intact.      Gait: Gait is intact.   Psychiatric:         Attention and Perception: Attention normal.         Mood and Affect: Mood normal.         Speech: Speech normal.         Behavior: Behavior is cooperative.         Palpation of the following region(s) revealed:  Cervical: Upper trapezius bilateral, hypertonicity and tenderness.  Levator scapulae bilateral, hypertonicity and tenderness.  Cervical paraspinals bilateral, hypertonicity and tenderness.  Thoracic: Thoracic paraspinals bilateral, hypertonicity and tenderness.  Lumbar: Lumbar paraspinals bilateral, hypertonicity and tenderness.  Quadratus lumborum left, hypertonicity and  tenderness.  Gluteal left, hypertonicity and tenderness.        Segmental Joint(s): Segmental joint dysfunction was assessed with motion palpation and is identified in the following areas:  Cervical : C2 C3 C4 C5  Thoracic : T2., T5, T6, and T10  Lumbopelvic / Sacral SIJ : L4, L5, L5/S1, and L SIJ      Assessment/Plan   Today's Treatment Included: Chiropractic manipulation to the Segmental Joint(s) Cervical : C2 C3 C4 C5 Segmental Joint(s) Lumbopelvic/Sacral SIJ : L4, L5, L5/S1, and L SIJ Segmental Joint(s) Thoracic : T2., T5, T6, and T10   Treatment Techniques Used : Diversified CMT, Pelvic drop table technique, and Manual traction  Soft-Tissue manipulation    Soft-tissue mobilization was performed in the following areas:   Cervical paraspinal mm. bilateral, Upper Trapezius bilateral, and Levator Scap. bilateral  Thoracic Paraspinal mm. bilateral  Lumbar Paraspinal mm. bilateral, Quadratus Lumborum left, and Gluteal mm. Glute. Med. left            The patient tolerated today's treatment with little or no additional discomfort and was instructed to contact the office for questions or concerns.

## 2024-08-08 ENCOUNTER — APPOINTMENT (OUTPATIENT)
Dept: INTEGRATIVE MEDICINE | Facility: CLINIC | Age: 44
End: 2024-08-08
Payer: COMMERCIAL

## 2024-08-20 DIAGNOSIS — I10 PRIMARY HYPERTENSION: ICD-10-CM

## 2024-08-21 ENCOUNTER — APPOINTMENT (OUTPATIENT)
Dept: INTEGRATIVE MEDICINE | Facility: CLINIC | Age: 44
End: 2024-08-21
Payer: COMMERCIAL

## 2024-08-21 DIAGNOSIS — M79.10 MYALGIA: ICD-10-CM

## 2024-08-21 DIAGNOSIS — M99.05 SEGMENTAL AND SOMATIC DYSFUNCTION OF PELVIC REGION: ICD-10-CM

## 2024-08-21 DIAGNOSIS — M99.01 SEGMENTAL AND SOMATIC DYSFUNCTION OF CERVICAL REGION: Primary | ICD-10-CM

## 2024-08-21 DIAGNOSIS — M99.03 SEGMENTAL AND SOMATIC DYSFUNCTION OF LUMBAR REGION: ICD-10-CM

## 2024-08-21 DIAGNOSIS — M99.02 SEGMENTAL AND SOMATIC DYSFUNCTION OF THORACIC REGION: ICD-10-CM

## 2024-08-21 DIAGNOSIS — M54.2 CERVICALGIA: ICD-10-CM

## 2024-08-21 DIAGNOSIS — M54.59 MECHANICAL LOW BACK PAIN: ICD-10-CM

## 2024-08-21 PROCEDURE — 98941 CHIROPRACT MANJ 3-4 REGIONS: CPT | Performed by: CHIROPRACTOR

## 2024-08-21 RX ORDER — LOSARTAN POTASSIUM 25 MG/1
25 TABLET ORAL DAILY
Qty: 90 TABLET | Refills: 0 | Status: SHIPPED | OUTPATIENT
Start: 2024-08-21

## 2024-08-21 NOTE — PROGRESS NOTES
Subjective   Patient ID: Gigi Blum is a 44 y.o. female who presents August 21, 2024 for neck and low back pain.    Visits: 4   $$$ PCY    Today, the patient rates their degree of pain as a 2 out of 10 on the numeric pain rating scale.     Gigi returns for continued treatment of neck pain. She states that she is well today. She states that she had a headache Monday but symptoms have mostly dissipated by today. She states that she mostly has neck and low back tightness today. Denies any associated symptoms or change in medical history since last visit and will follow up in 2 weeks.              Objective   Physical Exam  Constitutional:       General: She is not in acute distress.     Appearance: Normal appearance.       HENT:      Head: Normocephalic and atraumatic.   Musculoskeletal:      Cervical back: Tenderness present. Decreased range of motion.      Thoracic back: Tenderness present.      Lumbar back: Tenderness present.   Neurological:      General: No focal deficit present.      Mental Status: She is alert and oriented to person, place, and time.      Cranial Nerves: No dysarthria or facial asymmetry.      Sensory: Sensation is intact.      Motor: Motor function is intact.      Coordination: Coordination is intact.      Gait: Gait is intact.   Psychiatric:         Attention and Perception: Attention normal.         Mood and Affect: Mood normal.         Speech: Speech normal.         Behavior: Behavior is cooperative.         Palpation of the following region(s) revealed:  Cervical: Upper trapezius bilateral, hypertonicity and tenderness.  Levator scapulae bilateral, hypertonicity and tenderness.  Cervical paraspinals bilateral, hypertonicity and tenderness.  Thoracic: Thoracic paraspinals bilateral, hypertonicity and tenderness.  Lumbar: Lumbar paraspinals bilateral, hypertonicity and tenderness.  Quadratus lumborum left, hypertonicity and tenderness.  Gluteal left, hypertonicity and  tenderness.        Segmental Joint(s): Segmental joint dysfunction was assessed with motion palpation and is identified in the following areas:  Cervical : C2 C3 C4 C5  Thoracic : T2., T5, T6, and T10  Lumbopelvic / Sacral SIJ : L4, L5, L5/S1, and L SIJ      Assessment/Plan   Today's Treatment Included: Chiropractic manipulation to the Segmental Joint(s) Cervical : C2 C3 C4 C5 Segmental Joint(s) Lumbopelvic/Sacral SIJ : L4, L5, L5/S1, and L SIJ Segmental Joint(s) Thoracic : T2., T5, T6, and T10   Treatment Techniques Used : Diversified CMT, Pelvic drop table technique, and Manual traction  Soft-Tissue manipulation    Soft-tissue mobilization was performed in the following areas:   Cervical paraspinal mm. bilateral, Upper Trapezius bilateral, and Levator Scap. bilateral  Thoracic Paraspinal mm. bilateral  Lumbar Paraspinal mm. bilateral, Quadratus Lumborum left, and Gluteal mm. Glute. Med. left            The patient tolerated today's treatment with little or no additional discomfort and was instructed to contact the office for questions or concerns.

## 2024-08-26 ENCOUNTER — HOSPITAL ENCOUNTER (OUTPATIENT)
Dept: RADIOLOGY | Facility: CLINIC | Age: 44
Discharge: HOME | End: 2024-08-26
Payer: COMMERCIAL

## 2024-08-26 VITALS — HEIGHT: 63 IN | WEIGHT: 118 LBS | BODY MASS INDEX: 20.91 KG/M2

## 2024-08-26 DIAGNOSIS — Z12.31 BREAST CANCER SCREENING BY MAMMOGRAM: ICD-10-CM

## 2024-08-26 PROCEDURE — 77067 SCR MAMMO BI INCL CAD: CPT

## 2024-08-28 ENCOUNTER — APPOINTMENT (OUTPATIENT)
Dept: RADIOLOGY | Facility: CLINIC | Age: 44
End: 2024-08-28
Payer: COMMERCIAL

## 2024-09-04 ENCOUNTER — APPOINTMENT (OUTPATIENT)
Dept: INTEGRATIVE MEDICINE | Facility: CLINIC | Age: 44
End: 2024-09-04
Payer: COMMERCIAL

## 2024-09-04 DIAGNOSIS — M99.05 SEGMENTAL AND SOMATIC DYSFUNCTION OF PELVIC REGION: ICD-10-CM

## 2024-09-04 DIAGNOSIS — M99.01 SEGMENTAL AND SOMATIC DYSFUNCTION OF CERVICAL REGION: Primary | ICD-10-CM

## 2024-09-04 DIAGNOSIS — M54.2 CERVICALGIA: ICD-10-CM

## 2024-09-04 DIAGNOSIS — M79.10 MYALGIA: ICD-10-CM

## 2024-09-04 DIAGNOSIS — M99.02 SEGMENTAL AND SOMATIC DYSFUNCTION OF THORACIC REGION: ICD-10-CM

## 2024-09-04 DIAGNOSIS — M99.03 SEGMENTAL AND SOMATIC DYSFUNCTION OF LUMBAR REGION: ICD-10-CM

## 2024-09-04 DIAGNOSIS — M54.59 MECHANICAL LOW BACK PAIN: ICD-10-CM

## 2024-09-04 PROCEDURE — 98941 CHIROPRACT MANJ 3-4 REGIONS: CPT | Performed by: CHIROPRACTOR

## 2024-09-04 NOTE — PROGRESS NOTES
Subjective   Patient ID: Gigi Blum is a 44 y.o. female who presents September 4, 2024 for neck and low back pain.    Visits: 5   $$$ PCY    Today, the patient rates their degree of pain as a 1 out of 10 on the numeric pain rating scale.     Gigi returns for continued treatment of neck pain. She states that she has been feeling well lately. She states that she has not really had any neck pain since last visit. She states that she woke up with mild neck discomfort Monday but states that she had difficulty sleeping the night before and symptoms dissipated by yesterday morning. Denies any associated symptoms or change in medical history since last visit and will follow up in 1 month.              Objective   Physical Exam  Constitutional:       General: She is not in acute distress.     Appearance: Normal appearance.       HENT:      Head: Normocephalic and atraumatic.   Musculoskeletal:      Cervical back: Tenderness present. Decreased range of motion.      Thoracic back: Tenderness present.      Lumbar back: Tenderness present.   Neurological:      General: No focal deficit present.      Mental Status: She is alert and oriented to person, place, and time.      Cranial Nerves: No dysarthria or facial asymmetry.      Sensory: Sensation is intact.      Motor: Motor function is intact.      Coordination: Coordination is intact.      Gait: Gait is intact.   Psychiatric:         Attention and Perception: Attention normal.         Mood and Affect: Mood normal.         Speech: Speech normal.         Behavior: Behavior is cooperative.         Palpation of the following region(s) revealed:  Cervical: Upper trapezius bilateral, hypertonicity and tenderness.  Levator scapulae bilateral, hypertonicity and tenderness.  Cervical paraspinals bilateral, hypertonicity and tenderness.  Thoracic: Thoracic paraspinals bilateral, hypertonicity and tenderness.  Lumbar: Lumbar paraspinals bilateral, hypertonicity and  tenderness.  Quadratus lumborum left, hypertonicity and tenderness.  Gluteal left, hypertonicity and tenderness.        Segmental Joint(s): Segmental joint dysfunction was assessed with motion palpation and is identified in the following areas:  Cervical : C2 C3 C4 C5  Thoracic : T2., T5, T6, and T10  Lumbopelvic / Sacral SIJ : L4, L5, L5/S1, and L SIJ      Assessment/Plan   Today's Treatment Included: Chiropractic manipulation to the Segmental Joint(s) Cervical : C2 C3 C4 C5 Segmental Joint(s) Lumbopelvic/Sacral SIJ : L4, L5, L5/S1, and L SIJ Segmental Joint(s) Thoracic : T2., T5, T6, and T10   Treatment Techniques Used : Diversified CMT, Pelvic drop table technique, and Manual traction  Soft-Tissue manipulation    Soft-tissue mobilization was performed in the following areas:   Cervical paraspinal mm. bilateral, Upper Trapezius bilateral, and Levator Scap. bilateral  Thoracic Paraspinal mm. bilateral  Lumbar Paraspinal mm. bilateral, Quadratus Lumborum left, and Gluteal mm. Glute. Med. left            The patient tolerated today's treatment with little or no additional discomfort and was instructed to contact the office for questions or concerns.

## 2024-09-05 ENCOUNTER — HOSPITAL ENCOUNTER (OUTPATIENT)
Dept: RADIOLOGY | Facility: EXTERNAL LOCATION | Age: 44
Discharge: HOME | End: 2024-09-05

## 2024-09-12 ENCOUNTER — APPOINTMENT (OUTPATIENT)
Dept: PRIMARY CARE | Facility: CLINIC | Age: 44
End: 2024-09-12
Payer: COMMERCIAL

## 2024-09-12 VITALS
OXYGEN SATURATION: 98 % | SYSTOLIC BLOOD PRESSURE: 142 MMHG | HEIGHT: 63 IN | BODY MASS INDEX: 20.82 KG/M2 | DIASTOLIC BLOOD PRESSURE: 88 MMHG | WEIGHT: 117.5 LBS | HEART RATE: 61 BPM

## 2024-09-12 DIAGNOSIS — J30.2 SEASONAL ALLERGIES: Primary | ICD-10-CM

## 2024-09-12 DIAGNOSIS — A60.00 GENITAL HERPES SIMPLEX, UNSPECIFIED SITE: ICD-10-CM

## 2024-09-12 DIAGNOSIS — I10 PRIMARY HYPERTENSION: ICD-10-CM

## 2024-09-12 PROCEDURE — 99214 OFFICE O/P EST MOD 30 MIN: CPT | Performed by: PHYSICIAN ASSISTANT

## 2024-09-12 PROCEDURE — 3077F SYST BP >= 140 MM HG: CPT | Performed by: PHYSICIAN ASSISTANT

## 2024-09-12 PROCEDURE — 1036F TOBACCO NON-USER: CPT | Performed by: PHYSICIAN ASSISTANT

## 2024-09-12 PROCEDURE — 3008F BODY MASS INDEX DOCD: CPT | Performed by: PHYSICIAN ASSISTANT

## 2024-09-12 PROCEDURE — 3079F DIAST BP 80-89 MM HG: CPT | Performed by: PHYSICIAN ASSISTANT

## 2024-09-12 RX ORDER — VALACYCLOVIR HYDROCHLORIDE 500 MG/1
500 TABLET, FILM COATED ORAL DAILY
Qty: 90 TABLET | Refills: 1 | Status: SHIPPED | OUTPATIENT
Start: 2024-09-12 | End: 2025-03-11

## 2024-09-12 RX ORDER — LOSARTAN POTASSIUM 25 MG/1
25 TABLET ORAL DAILY
Qty: 90 TABLET | Refills: 1 | Status: SHIPPED | OUTPATIENT
Start: 2024-09-12

## 2024-09-12 ASSESSMENT — ENCOUNTER SYMPTOMS
VOMITING: 0
COUGH: 0
PALPITATIONS: 0
OCCASIONAL FEELINGS OF UNSTEADINESS: 0
PSYCHIATRIC NEGATIVE: 1
DEPRESSION: 0
LOSS OF SENSATION IN FEET: 0
HEMATOLOGIC/LYMPHATIC NEGATIVE: 1
SHORTNESS OF BREATH: 0
RESPIRATORY NEGATIVE: 1
ABDOMINAL PAIN: 0
DIZZINESS: 0
CARDIOVASCULAR NEGATIVE: 1
GASTROINTESTINAL NEGATIVE: 1
ARTHRALGIAS: 0
WHEEZING: 0
ALLERGIC/IMMUNOLOGIC NEGATIVE: 1
ENDOCRINE NEGATIVE: 1
MUSCULOSKELETAL NEGATIVE: 1
BACK PAIN: 0
NERVOUS/ANXIOUS: 0
HEADACHES: 0
NAUSEA: 0
EYES NEGATIVE: 1
NEUROLOGICAL NEGATIVE: 1
CONSTITUTIONAL NEGATIVE: 1

## 2024-09-12 ASSESSMENT — PATIENT HEALTH QUESTIONNAIRE - PHQ9
SUM OF ALL RESPONSES TO PHQ9 QUESTIONS 1 AND 2: 0
2. FEELING DOWN, DEPRESSED OR HOPELESS: NOT AT ALL
1. LITTLE INTEREST OR PLEASURE IN DOING THINGS: NOT AT ALL

## 2024-09-12 NOTE — PROGRESS NOTES
"Subjective   Patient ID: Gigi Blum is a 44 y.o. female who presents for Med Refill.    Med Refill  Pertinent negatives include no abdominal pain, arthralgias, chest pain, coughing, headaches, nausea, rash or vomiting.      Patient Stefanie Blum 44 y.o. female is here today for follow up  HTN - Stable on medications- did not take today - forgot - no CP no SOB no headache no edema   Due for refills     Herpes genital - takes only as needed usually before cycle - needs refill    patient states feeling well otherwise  denies fever, chills, N/V, headache, dizziness, CP, SOB, palpitations, edema, numbness, tingling, weakness  A chaperone was offered to the patient for the physical exam /  exam and was declined       Review of Systems   Constitutional: Negative.    HENT: Negative.     Eyes: Negative.    Respiratory: Negative.  Negative for cough, shortness of breath and wheezing.    Cardiovascular: Negative.  Negative for chest pain and palpitations.   Gastrointestinal: Negative.  Negative for abdominal pain, nausea and vomiting.   Endocrine: Negative.    Genitourinary: Negative.    Musculoskeletal: Negative.  Negative for arthralgias and back pain.   Skin: Negative.  Negative for rash.   Allergic/Immunologic: Negative.    Neurological: Negative.  Negative for dizziness and headaches.   Hematological: Negative.    Psychiatric/Behavioral: Negative.  The patient is not nervous/anxious.        Objective   /88 (BP Location: Right arm, Patient Position: Sitting)   Pulse 61   Ht 1.6 m (5' 3\")   Wt 53.3 kg (117 lb 8.1 oz)   SpO2 98%   BMI 20.82 kg/m²     Physical Exam  Vitals and nursing note reviewed.   Constitutional:       Appearance: Normal appearance.   HENT:      Head: Normocephalic and atraumatic.   Cardiovascular:      Rate and Rhythm: Normal rate and regular rhythm.   Pulmonary:      Effort: Pulmonary effort is normal.      Breath sounds: Normal breath sounds.   Abdominal:      General: Bowel sounds " are normal.      Palpations: Abdomen is soft.   Musculoskeletal:         General: Normal range of motion.      Cervical back: Neck supple.   Skin:     General: Skin is warm and dry.   Neurological:      General: No focal deficit present.      Mental Status: She is alert and oriented to person, place, and time.   Psychiatric:         Mood and Affect: Mood normal.         Behavior: Behavior normal.         Thought Content: Thought content normal.         Judgment: Judgment normal.       Assessment/Plan   Problem List Items Addressed This Visit       Primary hypertension    Relevant Medications    losartan (Cozaar) 25 mg tablet    Seasonal allergies - Primary    Genital herpes simplex    Relevant Medications    valACYclovir (Valtrex) 500 mg tablet        HTN    • Benign essential hypertension (401.1) (I10)  -stable- did not take medication today        -Continue current medications, side effects, risks and options discussed, patient aware   -Encouraged dietary sodium restriction/ DASH diet  -Recommend regular aerobic exercise  -Discussed need and benefit for weight loss  -Recheck in 4-6 months or sooner should any symptoms or problems arise  - Goal of blood pressure less than 130/80    Genital herpes- valtrex as needed      Back pain -stable    home stretches and exercises - sees chiropractor     complexity visit of 30   minutes face-to-face with at least half of the time discussing  Results of my examination, clinical findings, impression and diagnoses discussed with the patient as well as results of the latest test/lab work. The patient was in agreement with the plan and verbalized a good understanding of instructions and plans for treatment. At the end of the visit today, the patient felt that all questions had been answered satisfactorily. The patient was pleased with the visit and very appreciative for the care rendered.

## 2024-10-02 ENCOUNTER — APPOINTMENT (OUTPATIENT)
Dept: INTEGRATIVE MEDICINE | Facility: CLINIC | Age: 44
End: 2024-10-02
Payer: COMMERCIAL

## 2024-10-02 DIAGNOSIS — M99.01 SEGMENTAL AND SOMATIC DYSFUNCTION OF CERVICAL REGION: Primary | ICD-10-CM

## 2024-10-02 DIAGNOSIS — M54.59 MECHANICAL LOW BACK PAIN: ICD-10-CM

## 2024-10-02 DIAGNOSIS — M79.10 MYALGIA: ICD-10-CM

## 2024-10-02 DIAGNOSIS — M99.02 SEGMENTAL AND SOMATIC DYSFUNCTION OF THORACIC REGION: ICD-10-CM

## 2024-10-02 DIAGNOSIS — M99.05 SEGMENTAL AND SOMATIC DYSFUNCTION OF PELVIC REGION: ICD-10-CM

## 2024-10-02 DIAGNOSIS — M54.2 CERVICALGIA: ICD-10-CM

## 2024-10-02 DIAGNOSIS — M99.03 SEGMENTAL AND SOMATIC DYSFUNCTION OF LUMBAR REGION: ICD-10-CM

## 2024-10-02 PROCEDURE — 98941 CHIROPRACT MANJ 3-4 REGIONS: CPT | Performed by: CHIROPRACTOR

## 2024-10-02 NOTE — PROGRESS NOTES
Subjective   Patient ID: Stefanie Blum is a 44 y.o. female who presents October 2, 2024 for neck and low back pain.    Visits: 6   $$$ PCY    Today, the patient rates their degree of pain as a 1 out of 10 on the numeric pain rating scale.     Gigi returns for continued treatment of neck pain. She states that she is doing well. She states that she had neck stiffness for several days last week but symptoms dissipated quickly. She states that she is slightly stiff today. Denies any associated symptoms or change in medical history since last visit and will follow up in 1 month.              Objective   Physical Exam  Constitutional:       General: She is not in acute distress.     Appearance: Normal appearance.       HENT:      Head: Normocephalic and atraumatic.   Musculoskeletal:      Cervical back: Tenderness present. Decreased range of motion.      Thoracic back: Tenderness present.      Lumbar back: Tenderness present.   Neurological:      General: No focal deficit present.      Mental Status: She is alert and oriented to person, place, and time.      Cranial Nerves: No dysarthria or facial asymmetry.      Sensory: Sensation is intact.      Motor: Motor function is intact.      Coordination: Coordination is intact.      Gait: Gait is intact.   Psychiatric:         Attention and Perception: Attention normal.         Mood and Affect: Mood normal.         Speech: Speech normal.         Behavior: Behavior is cooperative.         Palpation of the following region(s) revealed:  Cervical: Upper trapezius bilateral, hypertonicity and tenderness.  Levator scapulae bilateral, hypertonicity and tenderness.  Cervical paraspinals bilateral, hypertonicity and tenderness.  Thoracic: Thoracic paraspinals bilateral, hypertonicity and tenderness.  Lumbar: Lumbar paraspinals bilateral, hypertonicity and tenderness.  Quadratus lumborum left, hypertonicity and tenderness.  Gluteal left, hypertonicity and tenderness.        Segmental  Joint(s): Segmental joint dysfunction was assessed with motion palpation and is identified in the following areas:  Cervical : C2 C3 C4 C5  Thoracic : T2., T5, T6, and T10  Lumbopelvic / Sacral SIJ : L4, L5, L5/S1, and L SIJ      Assessment/Plan   Today's Treatment Included: Chiropractic manipulation to the Segmental Joint(s) Cervical : C2 C3 C4 C5 Segmental Joint(s) Lumbopelvic/Sacral SIJ : L4, L5, L5/S1, and L SIJ Segmental Joint(s) Thoracic : T2., T5, T6, and T10   Treatment Techniques Used : Diversified CMT, Pelvic drop table technique, and Manual traction  Soft-Tissue manipulation    Soft-tissue mobilization was performed in the following areas:   Cervical paraspinal mm. bilateral, Upper Trapezius bilateral, and Levator Scap. bilateral  Thoracic Paraspinal mm. bilateral  Lumbar Paraspinal mm. bilateral, Quadratus Lumborum left, and Gluteal mm. Glute. Med. left            The patient tolerated today's treatment with little or no additional discomfort and was instructed to contact the office for questions or concerns.

## 2024-11-06 ENCOUNTER — APPOINTMENT (OUTPATIENT)
Dept: INTEGRATIVE MEDICINE | Facility: CLINIC | Age: 44
End: 2024-11-06
Payer: COMMERCIAL

## 2024-11-27 ENCOUNTER — APPOINTMENT (OUTPATIENT)
Dept: INTEGRATIVE MEDICINE | Facility: CLINIC | Age: 44
End: 2024-11-27
Payer: COMMERCIAL

## 2024-11-27 DIAGNOSIS — M99.01 SEGMENTAL AND SOMATIC DYSFUNCTION OF CERVICAL REGION: ICD-10-CM

## 2024-11-27 DIAGNOSIS — M79.10 MYALGIA: ICD-10-CM

## 2024-11-27 DIAGNOSIS — M99.03 SEGMENTAL AND SOMATIC DYSFUNCTION OF LUMBAR REGION: Primary | ICD-10-CM

## 2024-11-27 DIAGNOSIS — M99.05 SEGMENTAL AND SOMATIC DYSFUNCTION OF PELVIC REGION: ICD-10-CM

## 2024-11-27 DIAGNOSIS — M54.2 CERVICALGIA: ICD-10-CM

## 2024-11-27 DIAGNOSIS — M54.59 MECHANICAL LOW BACK PAIN: ICD-10-CM

## 2024-11-27 DIAGNOSIS — M99.02 SEGMENTAL AND SOMATIC DYSFUNCTION OF THORACIC REGION: ICD-10-CM

## 2024-11-27 PROCEDURE — 98941 CHIROPRACT MANJ 3-4 REGIONS: CPT | Performed by: CHIROPRACTOR

## 2024-11-27 NOTE — PROGRESS NOTES
Subjective   Patient ID: Stefanie Blum is a 44 y.o. female who presents November 27, 2024 for neck and low back pain.    Visits: 7   $$$ PCY    Today, the patient rates their degree of pain as a 3 out of 10 on the numeric pain rating scale.     Gigi returns for continued treatment of neck pain. She states that she is well today. She states that she has mild TL junction discomfort, mostly while extending during yoga. She states that she has left sided anterior pelvic pain, right over the ASIS. She states that her neck has been status quo. Denies any associated symptoms or change in medical history since last visit and will follow up in 1 month.              Objective   Physical Exam  Constitutional:       General: She is not in acute distress.     Appearance: Normal appearance.       HENT:      Head: Normocephalic and atraumatic.   Musculoskeletal:      Cervical back: Tenderness present. Decreased range of motion.      Thoracic back: Tenderness present.      Lumbar back: Tenderness present.   Neurological:      General: No focal deficit present.      Mental Status: She is alert and oriented to person, place, and time.      Cranial Nerves: No dysarthria or facial asymmetry.      Sensory: Sensation is intact.      Motor: Motor function is intact.      Coordination: Coordination is intact.      Gait: Gait is intact.   Psychiatric:         Attention and Perception: Attention normal.         Mood and Affect: Mood normal.         Speech: Speech normal.         Behavior: Behavior is cooperative.         Palpation of the following region(s) revealed:  Cervical: Upper trapezius bilateral, hypertonicity and tenderness.  Levator scapulae bilateral, hypertonicity and tenderness.  Cervical paraspinals bilateral, hypertonicity and tenderness.  Thoracic: Thoracic paraspinals bilateral, hypertonicity and tenderness.  Lumbar: Lumbar paraspinals bilateral, hypertonicity and tenderness.  Quadratus lumborum left, hypertonicity and  tenderness.  Gluteal left, hypertonicity and tenderness.        Segmental Joint(s): Segmental joint dysfunction was assessed with motion palpation and is identified in the following areas:  Cervical : C2 C3 C4 C5  Thoracic : T2., T5, T6, T10, and T12  Lumbopelvic / Sacral SIJ : L1, L4, L5, L5/S1, and L SIJ  Upper / Lower Extremities : R Hip and L Hip      Assessment/Plan   Today's Treatment Included: Chiropractic manipulation to the Segmental Joint(s) Cervical : C2 C3 C4 C5 Segmental Joint(s) Lumbopelvic/Sacral SIJ : L1, L4, L5, L5/S1, and L SIJ Segmental Joint(s) Thoracic : T2., T5, T6, T10, and T12 Segmental Joints Upper/Lower Extremities : R Hip and L Hip  Treatment Techniques Used : Diversified CMT, Pelvic drop table technique, and Manual traction  Soft-Tissue manipulation    Soft-tissue mobilization was performed in the following areas:   Cervical paraspinal mm. bilateral, Upper Trapezius bilateral, and Levator Scap. bilateral  Thoracic Paraspinal mm. bilateral  Lumbar Paraspinal mm. bilateral, Quadratus Lumborum left, and Gluteal mm. Glute. Med. left            The patient tolerated today's treatment with little or no additional discomfort and was instructed to contact the office for questions or concerns.

## 2024-12-11 ENCOUNTER — APPOINTMENT (OUTPATIENT)
Dept: INTEGRATIVE MEDICINE | Facility: CLINIC | Age: 44
End: 2024-12-11
Payer: COMMERCIAL

## 2024-12-24 ENCOUNTER — APPOINTMENT (OUTPATIENT)
Dept: INTEGRATIVE MEDICINE | Facility: CLINIC | Age: 44
End: 2024-12-24
Payer: COMMERCIAL

## 2025-01-02 ENCOUNTER — ALLIED HEALTH (OUTPATIENT)
Dept: INTEGRATIVE MEDICINE | Facility: CLINIC | Age: 45
End: 2025-01-02
Payer: COMMERCIAL

## 2025-01-02 DIAGNOSIS — M99.05 SEGMENTAL AND SOMATIC DYSFUNCTION OF PELVIC REGION: ICD-10-CM

## 2025-01-02 DIAGNOSIS — M99.02 SEGMENTAL AND SOMATIC DYSFUNCTION OF THORACIC REGION: ICD-10-CM

## 2025-01-02 DIAGNOSIS — M79.10 MYALGIA: ICD-10-CM

## 2025-01-02 DIAGNOSIS — M99.03 SEGMENTAL AND SOMATIC DYSFUNCTION OF LUMBAR REGION: Primary | ICD-10-CM

## 2025-01-02 DIAGNOSIS — M54.2 CERVICALGIA: ICD-10-CM

## 2025-01-02 DIAGNOSIS — M54.59 MECHANICAL LOW BACK PAIN: ICD-10-CM

## 2025-01-02 DIAGNOSIS — M99.01 SEGMENTAL AND SOMATIC DYSFUNCTION OF CERVICAL REGION: ICD-10-CM

## 2025-01-02 PROCEDURE — 98941 CHIROPRACT MANJ 3-4 REGIONS: CPT | Performed by: CHIROPRACTOR

## 2025-01-02 NOTE — PROGRESS NOTES
Subjective   Patient ID: Stefanie Blum is a 44 y.o. female who presents January 2, 2025 for neck and low back pain.    (1/) VPCY    Today, the patient rates their degree of pain as a 2 out of 10 on the numeric pain rating scale.     Gigi returns for continued treatment of neck pain. She states that she is well today. She states that she had improvement in lower back and hip symptoms after last visit that has lasted until today. She states that she occasionally has mild anterior hip pain. She states that she had increased neck tension yesterday, but symptoms have mostly dissipated by today. Denies any associated symptoms or change in medical history since last visit and will follow up in 3-4 weeks.              Objective   Physical Exam  Constitutional:       General: She is not in acute distress.     Appearance: Normal appearance.       HENT:      Head: Normocephalic and atraumatic.   Musculoskeletal:      Cervical back: Tenderness present. Decreased range of motion.      Thoracic back: Tenderness present.      Lumbar back: Tenderness present.   Neurological:      General: No focal deficit present.      Mental Status: She is alert and oriented to person, place, and time.      Cranial Nerves: No dysarthria or facial asymmetry.      Sensory: Sensation is intact.      Motor: Motor function is intact.      Coordination: Coordination is intact.      Gait: Gait is intact.   Psychiatric:         Attention and Perception: Attention normal.         Mood and Affect: Mood normal.         Speech: Speech normal.         Behavior: Behavior is cooperative.         Palpation of the following region(s) revealed:  Cervical: Upper trapezius bilateral, hypertonicity and tenderness.  Levator scapulae bilateral, hypertonicity and tenderness.  Cervical paraspinals bilateral, hypertonicity and tenderness.  Thoracic: Thoracic paraspinals bilateral, hypertonicity and tenderness.  Lumbar: Lumbar paraspinals bilateral, hypertonicity and  tenderness.  Quadratus lumborum left, hypertonicity and tenderness.  Gluteal left, hypertonicity and tenderness.        Segmental Joint(s): Segmental joint dysfunction was assessed with motion palpation and is identified in the following areas:  Cervical : C2 C3 C4 C5  Thoracic : T2., T5, T6, T10, and T12  Lumbopelvic / Sacral SIJ : L1, L4, L5, L5/S1, and L SIJ  Upper / Lower Extremities : R Hip and L Hip      Assessment/Plan   Today's Treatment Included: Chiropractic manipulation to the Segmental Joint(s) Cervical : C2 C3 C4 C5 Segmental Joint(s) Lumbopelvic/Sacral SIJ : L1, L4, L5, L5/S1, and L SIJ Segmental Joint(s) Thoracic : T2., T5, T6, T10, and T12 Segmental Joints Upper/Lower Extremities : R Hip and L Hip  Treatment Techniques Used : Diversified CMT, Pelvic drop table technique, and Manual traction  Soft-Tissue manipulation    Soft-tissue mobilization was performed in the following areas:   Cervical paraspinal mm. bilateral, Upper Trapezius bilateral, and Levator Scap. bilateral  Thoracic Paraspinal mm. bilateral  Lumbar Paraspinal mm. bilateral, Quadratus Lumborum left, and Gluteal mm. Glute. Med. left            The patient tolerated today's treatment with little or no additional discomfort and was instructed to contact the office for questions or concerns.

## 2025-01-03 ENCOUNTER — APPOINTMENT (OUTPATIENT)
Dept: INTEGRATIVE MEDICINE | Facility: CLINIC | Age: 45
End: 2025-01-03
Payer: COMMERCIAL

## 2025-01-09 ENCOUNTER — APPOINTMENT (OUTPATIENT)
Dept: INTEGRATIVE MEDICINE | Facility: CLINIC | Age: 45
End: 2025-01-09
Payer: COMMERCIAL

## 2025-01-15 ENCOUNTER — APPOINTMENT (OUTPATIENT)
Dept: INTEGRATIVE MEDICINE | Facility: CLINIC | Age: 45
End: 2025-01-15
Payer: COMMERCIAL

## 2025-01-30 ENCOUNTER — APPOINTMENT (OUTPATIENT)
Dept: INTEGRATIVE MEDICINE | Facility: CLINIC | Age: 45
End: 2025-01-30
Payer: COMMERCIAL

## 2025-02-11 ENCOUNTER — APPOINTMENT (OUTPATIENT)
Dept: PRIMARY CARE | Facility: CLINIC | Age: 45
End: 2025-02-11
Payer: COMMERCIAL

## 2025-02-11 VITALS
HEART RATE: 64 BPM | HEIGHT: 63 IN | SYSTOLIC BLOOD PRESSURE: 149 MMHG | OXYGEN SATURATION: 98 % | BODY MASS INDEX: 20.82 KG/M2 | WEIGHT: 117.5 LBS | DIASTOLIC BLOOD PRESSURE: 87 MMHG

## 2025-02-11 DIAGNOSIS — A60.00 GENITAL HERPES SIMPLEX, UNSPECIFIED SITE: ICD-10-CM

## 2025-02-11 DIAGNOSIS — Z12.11 COLON CANCER SCREENING: ICD-10-CM

## 2025-02-11 DIAGNOSIS — Z00.00 ROUTINE GENERAL MEDICAL EXAMINATION AT A HEALTH CARE FACILITY: Primary | ICD-10-CM

## 2025-02-11 DIAGNOSIS — I10 PRIMARY HYPERTENSION: ICD-10-CM

## 2025-02-11 PROCEDURE — 3077F SYST BP >= 140 MM HG: CPT | Performed by: PHYSICIAN ASSISTANT

## 2025-02-11 PROCEDURE — 3079F DIAST BP 80-89 MM HG: CPT | Performed by: PHYSICIAN ASSISTANT

## 2025-02-11 PROCEDURE — 1036F TOBACCO NON-USER: CPT | Performed by: PHYSICIAN ASSISTANT

## 2025-02-11 PROCEDURE — 99396 PREV VISIT EST AGE 40-64: CPT | Performed by: PHYSICIAN ASSISTANT

## 2025-02-11 PROCEDURE — 3008F BODY MASS INDEX DOCD: CPT | Performed by: PHYSICIAN ASSISTANT

## 2025-02-11 ASSESSMENT — ENCOUNTER SYMPTOMS
COUGH: 0
SHORTNESS OF BREATH: 0
ABDOMINAL PAIN: 0
CARDIOVASCULAR NEGATIVE: 1
ENDOCRINE NEGATIVE: 1
PALPITATIONS: 0
RESPIRATORY NEGATIVE: 1
HEADACHES: 0
WHEEZING: 0
ALLERGIC/IMMUNOLOGIC NEGATIVE: 1
CONSTITUTIONAL NEGATIVE: 1
NEUROLOGICAL NEGATIVE: 1
GASTROINTESTINAL NEGATIVE: 1
OCCASIONAL FEELINGS OF UNSTEADINESS: 0
HEMATOLOGIC/LYMPHATIC NEGATIVE: 1
VOMITING: 0
PSYCHIATRIC NEGATIVE: 1
LOSS OF SENSATION IN FEET: 0
DEPRESSION: 0
ARTHRALGIAS: 0
NERVOUS/ANXIOUS: 0
BACK PAIN: 0
NAUSEA: 0
EYES NEGATIVE: 1
DIZZINESS: 0
MUSCULOSKELETAL NEGATIVE: 1

## 2025-02-11 ASSESSMENT — PROMIS GLOBAL HEALTH SCALE
RATE_AVERAGE_FATIGUE: MILD
CARRYOUT_PHYSICAL_ACTIVITIES: COMPLETELY
EMOTIONAL_PROBLEMS: RARELY
CARRYOUT_SOCIAL_ACTIVITIES: VERY GOOD
RATE_SOCIAL_SATISFACTION: GOOD
RATE_AVERAGE_PAIN: 0
RATE_MENTAL_HEALTH: VERY GOOD
RATE_QUALITY_OF_LIFE: GOOD
RATE_GENERAL_HEALTH: VERY GOOD
RATE_PHYSICAL_HEALTH: GOOD

## 2025-02-11 ASSESSMENT — PATIENT HEALTH QUESTIONNAIRE - PHQ9
2. FEELING DOWN, DEPRESSED OR HOPELESS: NOT AT ALL
1. LITTLE INTEREST OR PLEASURE IN DOING THINGS: NOT AT ALL
SUM OF ALL RESPONSES TO PHQ9 QUESTIONS 1 AND 2: 0

## 2025-02-11 NOTE — PROGRESS NOTES
"Subjective   Patient ID: Gigi Blum is a 44 y.o. female who presents for Annual Exam.    HPI   Patient Stefanie Blum 43 y.o. 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 - lives with  children- 18yo 14 yo HB _   specialists - GYN   DUE dental and vision UTD      PMhx significant medical hx HTN iron def anemia - mild hyperlipidemia - on fish oil - genital herpes - vit d def   PShx: rhinoplasty and breast augmentation   Social hx: etoh- , no tobacco use, no illicit drug use   Healthy -low cholesterol diet and exercise- yoga   immunizations - tdap due today - declined flu and covid this season   FMhx: mother Htn - pancreatic cancer-  -- , father HTN     Past medical, surgical, social, and family history all reviewed     patient states feeling well otherwise  denies fever, chills, N/V, headache, dizziness, CP, SOB, palpitations, edema, numbness, tingling, weakness  A chaperone was offered to the patient for the physical exam /  exam and was declined         Review of Systems   Constitutional: Negative.    HENT: Negative.     Eyes: Negative.    Respiratory: Negative.  Negative for cough, shortness of breath and wheezing.    Cardiovascular: Negative.  Negative for chest pain and palpitations.   Gastrointestinal: Negative.  Negative for abdominal pain, nausea and vomiting.   Endocrine: Negative.    Genitourinary: Negative.    Musculoskeletal: Negative.  Negative for arthralgias and back pain.   Skin: Negative.  Negative for rash.   Allergic/Immunologic: Negative.    Neurological: Negative.  Negative for dizziness and headaches.   Hematological: Negative.    Psychiatric/Behavioral: Negative.  The patient is not nervous/anxious.        Objective   /87 (BP Location: Right arm, Patient Position: Sitting)   Pulse 64   Ht 1.605 m (5' 3.2\")   Wt 53.3 kg (117 lb 8.1 oz)   SpO2 98%   BMI 20.68 kg/m²     Physical Exam  Vitals and " nursing note reviewed.   Constitutional:       Appearance: Normal appearance. She is normal weight.   HENT:      Head: Normocephalic and atraumatic.      Right Ear: Tympanic membrane, ear canal and external ear normal.      Left Ear: Tympanic membrane, ear canal and external ear normal.      Nose: Nose normal.      Mouth/Throat:      Mouth: Mucous membranes are moist.      Pharynx: Oropharynx is clear.   Eyes:      Extraocular Movements: Extraocular movements intact.      Pupils: Pupils are equal, round, and reactive to light.   Cardiovascular:      Rate and Rhythm: Normal rate and regular rhythm.      Heart sounds: Normal heart sounds.   Pulmonary:      Effort: Pulmonary effort is normal.      Breath sounds: Normal breath sounds.   Abdominal:      General: Abdomen is flat. Bowel sounds are normal.      Palpations: Abdomen is soft.   Genitourinary:     Comments: Per GYN   Musculoskeletal:         General: Normal range of motion.      Cervical back: Normal range of motion and neck supple.   Skin:     General: Skin is warm and dry.   Neurological:      General: No focal deficit present.      Mental Status: She is alert and oriented to person, place, and time.   Psychiatric:         Mood and Affect: Mood normal.         Behavior: Behavior normal.         Thought Content: Thought content normal.         Judgment: Judgment normal.       Assessment/Plan   Problem List Items Addressed This Visit       Primary hypertension    Genital herpes simplex     Other Visit Diagnoses       Routine general medical examination at a health care facility    -  Primary    Relevant Orders    Comprehensive Metabolic Panel    Colon cancer screening        Relevant Orders    Colonoscopy Screening; Average Risk Patient        Well exam   -  patient stable and healthy  -  discussed healthy diet and exercise plan   -  continue medications as directed, SEs, risks and options discussed   -  f/u with specialists as directed   -  UTD on dental and  vision exams, f/u as directed   -  Labs ordered today, pt advised to call office when results are available to discuss with provider    -Vaccinations utd   -Follow-up annual exam in one year  -Colon cancer screening DUE   -Follow-up in one week to discuss all results    HTN    • Benign essential hypertension (401.1) (I10)  -stable- did not take medication today        -Continue current medications, side effects, risks and options discussed, patient aware   -Encouraged dietary sodium restriction/ DASH diet  -Recommend regular aerobic exercise  -Discussed need and benefit for weight loss  -Recheck in 4-6 months or sooner should any symptoms or problems arise  - Goal of blood pressure less than 130/80     Genital herpes- valtrex as needed      Back pain -stable    home stretches and exercises - sees chiropractor     Results of my examination, clinical findings, impression and diagnoses discussed with the patient as well as results of the latest test/lab work. The patient was in agreement with the plan and verbalized a good understanding of instructions and plans for treatment. At the end of the visit today, the patient felt that all questions had been answered satisfactorily. The patient was pleased with the visit and very appreciative for the care rendered.

## 2025-02-27 ENCOUNTER — APPOINTMENT (OUTPATIENT)
Dept: INTEGRATIVE MEDICINE | Facility: CLINIC | Age: 45
End: 2025-02-27
Payer: COMMERCIAL

## 2025-03-13 ENCOUNTER — APPOINTMENT (OUTPATIENT)
Dept: INTEGRATIVE MEDICINE | Facility: CLINIC | Age: 45
End: 2025-03-13
Payer: COMMERCIAL

## 2025-03-13 DIAGNOSIS — M54.2 CERVICALGIA: ICD-10-CM

## 2025-03-13 DIAGNOSIS — M79.10 MYALGIA: ICD-10-CM

## 2025-03-13 DIAGNOSIS — M99.01 SEGMENTAL AND SOMATIC DYSFUNCTION OF CERVICAL REGION: ICD-10-CM

## 2025-03-13 DIAGNOSIS — M99.05 SEGMENTAL AND SOMATIC DYSFUNCTION OF PELVIC REGION: ICD-10-CM

## 2025-03-13 DIAGNOSIS — M99.03 SEGMENTAL AND SOMATIC DYSFUNCTION OF LUMBAR REGION: Primary | ICD-10-CM

## 2025-03-13 DIAGNOSIS — M54.59 MECHANICAL LOW BACK PAIN: ICD-10-CM

## 2025-03-13 DIAGNOSIS — M99.02 SEGMENTAL AND SOMATIC DYSFUNCTION OF THORACIC REGION: ICD-10-CM

## 2025-03-13 PROCEDURE — 98941 CHIROPRACT MANJ 3-4 REGIONS: CPT | Performed by: CHIROPRACTOR

## 2025-03-13 NOTE — PROGRESS NOTES
Subjective   Patient ID: Gigi Blum is a 44 y.o. female who presents March 13, 2025 for neck and low back pain.    (2/30) VPCY    Today, the patient rates their degree of pain as a 3 out of 10 on the numeric pain rating scale.     Gigi returns for continued treatment of neck pain. She states that she is well. She states that she has left shoulder and left hip discomfort that began a few weeks ago without incident. She states that she has shoulder discomfort when bringing it out to the side. She states that hip discomfort is similar to what she has presented with in the past. She states that neck symptoms are about the same as last visit. Denies any associated symptoms or change in medical history since last visit and will follow up in 3-4 weeks.              Objective   Physical Exam  Constitutional:       General: She is not in acute distress.     Appearance: Normal appearance.       HENT:      Head: Normocephalic and atraumatic.   Musculoskeletal:      Cervical back: Tenderness present. Decreased range of motion.      Thoracic back: Tenderness present.      Lumbar back: Tenderness present.   Neurological:      General: No focal deficit present.      Mental Status: She is alert and oriented to person, place, and time.      Cranial Nerves: No dysarthria or facial asymmetry.      Sensory: Sensation is intact.      Motor: Motor function is intact.      Coordination: Coordination is intact.      Gait: Gait is intact.   Psychiatric:         Attention and Perception: Attention normal.         Mood and Affect: Mood normal.         Speech: Speech normal.         Behavior: Behavior is cooperative.         Palpation of the following region(s) revealed:  Cervical: Upper trapezius bilateral, hypertonicity and tenderness.  Levator scapulae bilateral, hypertonicity and tenderness.  Cervical paraspinals bilateral, hypertonicity and tenderness.  Thoracic: Thoracic paraspinals bilateral, hypertonicity and tenderness.  Lumbar:  Lumbar paraspinals bilateral, hypertonicity and tenderness.  Quadratus lumborum left, hypertonicity and tenderness.  Gluteal left, hypertonicity and tenderness.        Segmental Joint(s): Segmental joint dysfunction was assessed with motion palpation and is identified in the following areas:  Cervical : C2 C3 C4 C5  Thoracic : T2., T5, T6, T10, and T12  Lumbopelvic / Sacral SIJ : L1, L4, L5, L5/S1, and L SIJ  Upper / Lower Extremities : R Hip and L Hip      Assessment/Plan   Today's Treatment Included: Chiropractic manipulation to the Segmental Joint(s) Cervical : C2 C3 C4 C5 Segmental Joint(s) Lumbopelvic/Sacral SIJ : L1, L4, L5, L5/S1, and L SIJ Segmental Joint(s) Thoracic : T2., T5, T6, T10, and T12 Segmental Joints Upper/Lower Extremities : R Hip and L Hip  Treatment Techniques Used : Diversified CMT, Pelvic drop table technique, and Manual traction  Soft-Tissue manipulation    Soft-tissue mobilization was performed in the following areas:   Cervical paraspinal mm. bilateral, Upper Trapezius bilateral, and Levator Scap. bilateral  Thoracic Paraspinal mm. bilateral  Lumbar Paraspinal mm. bilateral, Quadratus Lumborum left, and Gluteal mm. Glute. Med. left  Terres minor left.    Patient was shown terres minor stretch.      The patient tolerated today's treatment with little or no additional discomfort and was instructed to contact the office for questions or concerns.

## 2025-04-10 ENCOUNTER — APPOINTMENT (OUTPATIENT)
Dept: INTEGRATIVE MEDICINE | Facility: CLINIC | Age: 45
End: 2025-04-10
Payer: COMMERCIAL

## 2025-05-16 ENCOUNTER — OFFICE VISIT (OUTPATIENT)
Dept: URGENT CARE | Age: 45
End: 2025-05-16
Payer: COMMERCIAL

## 2025-05-16 VITALS
TEMPERATURE: 98.1 F | OXYGEN SATURATION: 98 % | WEIGHT: 120 LBS | HEIGHT: 63 IN | HEART RATE: 64 BPM | BODY MASS INDEX: 21.26 KG/M2 | RESPIRATION RATE: 17 BRPM | SYSTOLIC BLOOD PRESSURE: 174 MMHG | DIASTOLIC BLOOD PRESSURE: 88 MMHG

## 2025-05-16 DIAGNOSIS — R30.0 DYSURIA: ICD-10-CM

## 2025-05-16 DIAGNOSIS — R10.9 ACUTE RIGHT FLANK PAIN: Primary | ICD-10-CM

## 2025-05-16 LAB
POC APPEARANCE, URINE: CLEAR
POC BILIRUBIN, URINE: NEGATIVE
POC BLOOD, URINE: NEGATIVE
POC COLOR, URINE: YELLOW
POC GLUCOSE, URINE: NEGATIVE MG/DL
POC KETONES, URINE: NEGATIVE MG/DL
POC LEUKOCYTES, URINE: NEGATIVE
POC NITRITE,URINE: NEGATIVE
POC PH, URINE: 7 PH
POC PROTEIN, URINE: NEGATIVE MG/DL
POC SPECIFIC GRAVITY, URINE: 1.01
POC UROBILINOGEN, URINE: 0.2 EU/DL
PREGNANCY TEST URINE, POC: NEGATIVE

## 2025-05-16 RX ORDER — NITROFURANTOIN 25; 75 MG/1; MG/1
100 CAPSULE ORAL 2 TIMES DAILY
Qty: 14 CAPSULE | Refills: 0 | Status: SHIPPED | OUTPATIENT
Start: 2025-05-16 | End: 2025-05-23

## 2025-05-16 ASSESSMENT — ENCOUNTER SYMPTOMS
LOSS OF SENSATION IN FEET: 0
OCCASIONAL FEELINGS OF UNSTEADINESS: 0
DEPRESSION: 0

## 2025-05-16 NOTE — PROGRESS NOTES
"    History Of Present Illness:   Stefanie Blum \"Gigi\"      Patient presents to the [facility]:     Patient presents for possible kidney infection.  She has had right-sided flank and side back pain for the past 2 days.  Worse with turning, 3 out of 10 with turning pain.  She feels a little off, she thinks it may be due to her elevated blood pressure.  No hematuria, no dysuria.  2 weeks ago she had increased urinary pressure and urgency.        PMH:  - Previous history of asymptomatic urinary tract infections.  Hypertension on Cozaar.  Hospitalized for pyelonephritis in the past.  Reported history of Ureaplasma and mycoplasma in the urine in the past.        Tobacco:  - None                           Review of Systems (BOLD if positive, delete if not asked):     Constitutional:   - fever   - chills   - night sweats  - unexpected weight change       Eyes:   - loss of vision  - double vision  - floaters     Ear/Nose/Throat/Mouth:   - hearing changes  - sore throat  - sinus congestion     Cardiovascular:   - chest pain  - chest heaviness  - palpitations  - swelling in ankles       Respiratory:   - shortness of breath  - difficulty breathing  - frequent cough  - wheezing    Musculoskeletal:   - bone pain  - muscle pain  - joint pain   -low back pain       Neurological:   - headache  - loss of consciousness  - tremors  - dizzy spells  - numbness   - tingling       Gastrointestinal:   - abdominal pain  - nausea  - vomiting  - constipation  - diarrhea  - bloody stools  - loss of bowel control  - heartburn       Genitourinary:   - urinary incontinence  - increased urinary frequency  - painful urination  - blood in urine             Psychological:   - feeling generally happy  - feeling safe at home          Last Recorded Vitals:  Vitals:    05/16/25 0908   BP: 174/88   BP Location: Right arm   Patient Position: Sitting   BP Cuff Size: Small adult   Pulse: 64   Resp: 17   Temp: 36.7 °C (98.1 °F) " "  TempSrc: Oral   SpO2: 98%   Weight: 54.4 kg (120 lb)   Height: 1.6 m (5' 3\")        Past Medical History:  She has no past medical history on file.     Past Surgical History:  She has a past surgical history that includes Rhinoplasty; Breast surgery (Bilateral); and Mouth surgery.     Social History:  She reports that she has never smoked. She has never used smokeless tobacco. She reports that she does not drink alcohol and does not use drugs.     Family History:  Family History[1]  Allergies:  Patient has no known allergies.     Outpatient Medications:  Current Outpatient Medications   Medication Instructions    ergocalciferol (VITAMIN D-2) 1,250 mcg, oral, Once Weekly    FeroSuL tablet 1 tablet, Daily    losartan (COZAAR) 25 mg, oral, Daily                Physical Exam:  GENERAL: Well developed, well nourished, alert and cooperative, and appears to be in no acute distress.     PSYCH: mood pleasant and appropriate     HEAD: normocephalic     EYES: PERRL, EOMI. vision is grossly intact.               CARDIAC:   RRR.   No murmur.       LUNGS: Good respiratory effort.  Clear to auscultation bilaterally  No rales  No rhonchi  No wheezing     ABD: soft  Nontender  No masses palpated.   No rebound tenderness or guarding  No CVA tenderness bilaterally       GAIT: Normal            Last Labs:  CBC -  Lab Results   Component Value Date    WBC 5.9 02/06/2024    HGB 11.2 (L) 02/06/2024    HCT 35.5 (L) 02/06/2024    MCV 72 (L) 02/06/2024     02/06/2024        CMP -  Lab Results   Component Value Date    CALCIUM 9.2 02/06/2024    PROT 6.3 (L) 02/06/2024    ALBUMIN 4.2 02/06/2024    AST 18 02/06/2024    ALT 11 02/06/2024    ALKPHOS 47 02/06/2024    BILITOT 0.7 02/06/2024        LIPID PANEL -  Lab Results   Component Value Date    CHOL 178 02/06/2024    TRIG 45 02/06/2024    HDL 71.4 02/06/2024    CHHDL 2.5 02/06/2024    VLDL 9 02/06/2024    NHDL 107 02/06/2024           No results found for: \"BNP\", \"HGBA1C\"       BI " mammo bilateral screening tomosynthesis  Narrative: Interpreted By:  Caridad Carbone,   STUDY:  BI MAMMO BILATERAL SCREENING TOMOSYNTHESIS;  8/26/2024 4:35 pm      ACCESSION NUMBER(S):  [id number]      ORDERING CLINICIAN:  GIAN KOHLI      INDICATION:  Screening.      ,Z12.31 Encounter for screening mammogram for malignant neoplasm of  breast      COMPARISON:  08/16/2021, 06/12/2020.      FINDINGS:  2D and tomosynthesis images were reviewed at 1 mm slice thickness.      Density:  The breast tissue is heterogeneously dense, which may  obscure small masses.      Retropectoral silicone implants are in place; standard and implant  displaced views were obtained. No suspicious masses or calcifications  are identified.      Impression: No mammographic evidence of malignancy.      BI-RADS CATEGORY:  BI-RADS Category:  1 Negative.  Recommendation:  Annual Screening.  Recommended Date:  1 Year.  Laterality:  Bilateral.              For any future breast imaging appointments, please call [phone]  ([phone]).          MACRO:  None      Signed by: Caridad Carbone 9/5/2024 2:07 PM  Dictation workstation:   [id number]  BI transfer of outside films  Outside images for comparison or treatment purposes, not interpreted by    Radiologists.  BI transfer of outside films  Outside images for comparison or treatment purposes, not interpreted by    Radiologists.  BI transfer of outside films  Outside images for comparison or treatment purposes, not interpreted by    Radiologists.         Urine pregnancy test: negative  Urinalysis: negative    Will send urine culture.       Assessment/Plan   Problem List Items Addressed This Visit    None  Visit Diagnoses         Codes      Acute right flank pain    -  Primary R10.9      Dysuria     R30.0    Relevant Orders    POCT UA Automated manually resulted (Completed)    POCT pregnancy, urine manually resulted (Completed)    Urine Culture               Patient disposition:  "Home      Nice to meet you in the urgent care today.      Visit today for:   - Right sided flank pain for 2 days.  With the history of previous urinary tract infections and pyelonephritis we agreed after shared decision making discussion sending a backup antibiotic of Macrobid to the pharmacy, we will send a urine culture off to the lab and call you if it is positive.  - Elevated blood pressure in the urgent care      All questions were today by the end of the visit.        New medications today:  - Macrobid 100 mg twice a day for 7 days                Work note printed.             Follow up with your PCP if you are not improved in the next week.            Referrals and advanced imaging scheduling line: [phone].  Another scheduling number is: [phone].  Another potential phone number is: [phone] ([phone]).  The number for pediatric referrals is: [phone].        UTI:    Urinary tract infections, or \"UTIs,\" are infections that affect either the bladder or the kidneys:    ?Bladder infections are more common than kidney infections. They happen when bacteria get into the urethra and travel up into the bladder. The medical term for bladder infection is \"cystitis.\" Most of the time, when people talk about a UTI, they mean a bladder infection.    ?Kidney infections happen when the bacteria travel even higher, up into the kidneys. The medical term for kidney infection is \"pyelonephritis.\" This is more serious than a bladder infection, and can lead to other serious problems if it is not treated properly.    Both bladder and kidney infections are more common in females than males.    The symptoms include:    ?Pain or a burning feeling when you urinate    ?The need to urinate often    ?The need to urinate suddenly or in a hurry    ?Blood in the urine        How are UTIs treated?    Most are treated with antibiotic pills. These work by killing the germs that cause the infection.    ?If you have a " bladder infection, you will probably need to take antibiotics for 3 to 7 days.        ....................  Recommend you get evaluated in the Emergency Department or call 9-1-1, if you experience chest pain, severe difficulty with breathing, loss of consciousness, major trauma, uncontrolled fever or blood pressure, sustained heart rate over 100, loss of vision, more than a teaspoon of bleeding from your GI tract, or signs of stroke.             Arjun Villagran DO          [1]   Family History  Problem Relation Name Age of Onset    Other (htn) Mother      Pancreatic cancer Mother      Other (htn) Father      Prostate cancer Father      Prostate cancer Brother

## 2025-05-16 NOTE — LETTER
May 16, 2025     Patient: Stefanie Blum   YOB: 1980   Date of Visit: 5/16/2025       To Whom It May Concern:    Stefanie Blum was seen in my clinic on 5/16/2025 at 8:30 am. Please excuse Stefanie for her absence from work on this day to make the appointment.    If you have any questions or concerns, please don't hesitate to call.         Sincerely,         Arjun Villagran, DO        CC: No Recipients

## 2025-05-17 ENCOUNTER — TELEPHONE (OUTPATIENT)
Dept: URGENT CARE | Age: 45
End: 2025-05-17

## 2025-05-18 LAB — BACTERIA UR CULT: NORMAL

## 2025-06-17 ENCOUNTER — APPOINTMENT (OUTPATIENT)
Dept: OBSTETRICS AND GYNECOLOGY | Facility: CLINIC | Age: 45
End: 2025-06-17
Payer: COMMERCIAL

## 2025-06-17 VITALS
BODY MASS INDEX: 21.97 KG/M2 | SYSTOLIC BLOOD PRESSURE: 150 MMHG | DIASTOLIC BLOOD PRESSURE: 98 MMHG | HEART RATE: 56 BPM | WEIGHT: 124 LBS | HEIGHT: 63 IN

## 2025-06-17 DIAGNOSIS — Z11.3 ROUTINE SCREENING FOR STI (SEXUALLY TRANSMITTED INFECTION): ICD-10-CM

## 2025-06-17 DIAGNOSIS — Z12.31 ENCOUNTER FOR SCREENING MAMMOGRAM FOR MALIGNANT NEOPLASM OF BREAST: Primary | ICD-10-CM

## 2025-06-17 DIAGNOSIS — R30.0 DYSURIA: ICD-10-CM

## 2025-06-17 PROCEDURE — 1036F TOBACCO NON-USER: CPT

## 2025-06-17 PROCEDURE — 99386 PREV VISIT NEW AGE 40-64: CPT

## 2025-06-17 PROCEDURE — 3008F BODY MASS INDEX DOCD: CPT

## 2025-06-17 PROCEDURE — 3080F DIAST BP >= 90 MM HG: CPT

## 2025-06-17 PROCEDURE — 3077F SYST BP >= 140 MM HG: CPT

## 2025-06-17 RX ORDER — VALACYCLOVIR HYDROCHLORIDE 500 MG/1
TABLET, FILM COATED ORAL
COMMUNITY
Start: 2025-04-11

## 2025-06-17 NOTE — PROGRESS NOTES
"Subjective   Patient ID: Stefanie Blum \"Gigi\" is a 45 y.o. female who presents for Annual Exam.  45-year-old female ( x 2),  presents to establish care/annual exam.  She recently relocated to [place] from [place].  She currently has IUD in place however she is unsure if it was placed in 2018 or 2019.  She is sure that is Mirena.  She is experiencing amenorrhea, no complaints of abdominal pelvic plain.  She denies vasomotor symptoms. No vaginal complaints, no breast complaints.  Patient states she does have intermittent right flank pain with hx of Microplasma/Ureaplasma in  which gave her the same symptoms and is hoping to be retested for this today.  She does have adequate water intake drinking at least 2 L water a day.  No history of kidney stones.  No other signs or symptoms of UTI.    Last pap - neg/neg   Last mammogram- overall benign   She has not yet completed colonoscopy,  she is working on having this done.  She does not have support in the area and is trying establish a ride after procedure.     Medical history: HTN on losartan may need medication adjusted.  She will contact her PCP for elevated blood pressure today in the office.  Surgical history: Denies  No significant family history of breast, uterine, and ovarian cancer.    Social history: No tobacco, rate ETOH use       Review of Systems   All other systems reviewed and are negative.      Objective   Physical Exam  Vitals and nursing note reviewed.   Constitutional:       Appearance: Normal appearance.   HENT:      Head: Normocephalic.      Nose: Nose normal.      Mouth/Throat:      Mouth: Mucous membranes are moist.      Pharynx: Oropharynx is clear.   Eyes:      Conjunctiva/sclera: Conjunctivae normal.      Pupils: Pupils are equal, round, and reactive to light.   Cardiovascular:      Rate and Rhythm: Normal rate and regular rhythm.      Pulses: Normal pulses.   Pulmonary:      Effort: Pulmonary effort is normal.      Breath sounds: Normal " breath sounds.   Chest:   Breasts:     Right: Normal.      Left: Normal.      Comments: Bilateral implant symmetrical, no concern for infection  Abdominal:      General: Bowel sounds are normal.      Palpations: Abdomen is soft.   Genitourinary:     General: Normal vulva.      Exam position: Lithotomy position.      Vagina: Normal.      Cervix: Normal.      Uterus: Normal.       Adnexa: Right adnexa normal and left adnexa normal.      Rectum: Normal.   Musculoskeletal:         General: Normal range of motion.      Cervical back: Normal range of motion and neck supple.   Skin:     General: Skin is warm and dry.      Capillary Refill: Capillary refill takes less than 2 seconds.   Neurological:      General: No focal deficit present.      Mental Status: She is alert and oriented to person, place, and time. Mental status is at baseline.   Psychiatric:         Mood and Affect: Mood normal.         Assessment/Plan   Problem List Items Addressed This Visit    None  Visit Diagnoses         Codes      Encounter for screening mammogram for malignant neoplasm of breast    -  Primary Z12.31    Relevant Orders    BI mammo bilateral screening tomosynthesis      Routine screening for STI (sexually transmitted infection)     Z11.3    Relevant Orders    C. trachomatis / N. gonorrhoeae, Amplified, Urogenital    Trichomonas vaginalis, Amplified      Dysuria     R30.0    Relevant Orders    Microplasma/ureaplasma culture; Other-indicate in comment ([facility]); 19999 - Miscellaneous Test          - Order placed for mammogram to be done in September.  - Patient to obtain her records to clarify date of IUD placement to determine when removal and reinsertion are due, she will come back into the office for this  - Discussed elevated blood pressure today in the office.  Patient states that she and her PCP have plan to titrate up if her blood pressures have not been elevated.  She will contact her PCP today.  - Patient to return to clinic in 1  year for annual exam or sooner if IUD requires replacement or if she has any additional questions/concerns.         Aure Santana, RENAY-CNP 06/17/25 2:51 PM

## 2025-06-18 LAB
C TRACH RRNA SPEC QL NAA+PROBE: NOT DETECTED
N GONORRHOEA RRNA SPEC QL NAA+PROBE: NOT DETECTED
QUEST GC CT AMPLIFIED (ALWAYS MESSAGE): NORMAL
T VAGINALIS RRNA SPEC QL NAA+PROBE: NOT DETECTED

## 2025-07-02 LAB
REF LAB TEST NAME: NORMAL
REF LAB TEST RESULTS: NORMAL

## 2025-07-10 ENCOUNTER — APPOINTMENT (OUTPATIENT)
Dept: OBSTETRICS AND GYNECOLOGY | Facility: CLINIC | Age: 45
End: 2025-07-10
Payer: COMMERCIAL

## 2025-09-03 DIAGNOSIS — I10 PRIMARY HYPERTENSION: ICD-10-CM

## 2025-09-03 RX ORDER — LOSARTAN POTASSIUM 25 MG/1
25 TABLET ORAL DAILY
Qty: 90 TABLET | Refills: 0 | Status: SHIPPED | OUTPATIENT
Start: 2025-09-03

## 2025-09-04 ENCOUNTER — HOSPITAL ENCOUNTER (OUTPATIENT)
Dept: RADIOLOGY | Facility: CLINIC | Age: 45
Discharge: HOME | End: 2025-09-04
Payer: COMMERCIAL

## 2025-09-04 VITALS — HEIGHT: 63 IN | WEIGHT: 123.9 LBS | BODY MASS INDEX: 21.95 KG/M2

## 2025-09-04 DIAGNOSIS — Z12.31 ENCOUNTER FOR SCREENING MAMMOGRAM FOR MALIGNANT NEOPLASM OF BREAST: ICD-10-CM

## 2025-09-04 PROCEDURE — 77063 BREAST TOMOSYNTHESIS BI: CPT | Performed by: RADIOLOGY

## 2025-09-04 PROCEDURE — 77063 BREAST TOMOSYNTHESIS BI: CPT

## 2025-09-04 PROCEDURE — 77067 SCR MAMMO BI INCL CAD: CPT | Performed by: RADIOLOGY

## 2025-09-29 ENCOUNTER — APPOINTMENT (OUTPATIENT)
Dept: PRIMARY CARE | Facility: CLINIC | Age: 45
End: 2025-09-29
Payer: COMMERCIAL